# Patient Record
Sex: FEMALE | Race: BLACK OR AFRICAN AMERICAN | Employment: UNEMPLOYED | URBAN - METROPOLITAN AREA
[De-identification: names, ages, dates, MRNs, and addresses within clinical notes are randomized per-mention and may not be internally consistent; named-entity substitution may affect disease eponyms.]

---

## 2019-05-19 ENCOUNTER — HOSPITAL ENCOUNTER (EMERGENCY)
Age: 84
Discharge: HOME | End: 2019-05-19
Attending: EMERGENCY MEDICINE
Payer: MEDICARE

## 2019-05-19 ENCOUNTER — APPOINTMENT (OUTPATIENT)
Dept: GENERAL RADIOLOGY | Age: 84
End: 2019-05-19
Payer: MEDICARE

## 2019-05-19 VITALS
TEMPERATURE: 98.1 F | DIASTOLIC BLOOD PRESSURE: 93 MMHG | RESPIRATION RATE: 18 BRPM | SYSTOLIC BLOOD PRESSURE: 179 MMHG | BODY MASS INDEX: 30.32 KG/M2 | HEIGHT: 65 IN | WEIGHT: 182 LBS | HEART RATE: 74 BPM | OXYGEN SATURATION: 99 %

## 2019-05-19 DIAGNOSIS — I10 ESSENTIAL HYPERTENSION: ICD-10-CM
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
## 2019-05-19 DIAGNOSIS — M25.561 ACUTE PAIN OF RIGHT KNEE: ICD-10-CM

## 2019-05-19 PROCEDURE — 6370000000 HC RX 637 (ALT 250 FOR IP): Performed by: STUDENT IN AN ORGANIZED HEALTH CARE EDUCATION/TRAINING PROGRAM

## 2019-05-19 PROCEDURE — 73562 X-RAY EXAM OF KNEE 3: CPT

## 2019-05-19 PROCEDURE — 99283 EMERGENCY DEPT VISIT LOW MDM: CPT

## 2019-05-19 PROCEDURE — 73552 X-RAY EXAM OF FEMUR 2/>: CPT

## 2019-05-19 RX ORDER — LISINOPRIL 20 MG/1
20 TABLET ORAL DAILY
Status: DISCONTINUED | OUTPATIENT
Start: 2019-05-19 | End: 2019-05-19 | Stop reason: HOSPADM

## 2019-05-19 RX ORDER — AMLODIPINE BESYLATE 5 MG/1
5 TABLET ORAL DAILY
Status: DISCONTINUED | OUTPATIENT
Start: 2019-05-19 | End: 2019-05-19 | Stop reason: HOSPADM

## 2019-05-19 RX ORDER — METOPROLOL SUCCINATE 25 MG/1
25 TABLET, EXTENDED RELEASE ORAL DAILY
Status: DISCONTINUED | OUTPATIENT
Start: 2019-05-19 | End: 2019-05-19 | Stop reason: HOSPADM

## 2019-05-19 RX ORDER — HYDROCODONE BITARTRATE AND ACETAMINOPHEN 5; 325 MG/1; MG/1
1 TABLET ORAL ONCE
Status: COMPLETED | OUTPATIENT
Start: 2019-05-19 | End: 2019-05-19

## 2019-05-19 RX ORDER — HYDRALAZINE HYDROCHLORIDE 20 MG/ML
10 INJECTION INTRAMUSCULAR; INTRAVENOUS ONCE
Status: DISCONTINUED | OUTPATIENT
Start: 2019-05-19 | End: 2019-05-19

## 2019-05-19 RX ADMIN — AMLODIPINE BESYLATE 5 MG: 5 TABLET ORAL at 16:53

## 2019-05-19 RX ADMIN — LISINOPRIL 20 MG: 20 TABLET ORAL at 19:38

## 2019-05-19 RX ADMIN — HYDROCODONE BITARTRATE AND ACETAMINOPHEN 1 TABLET: 5; 325 TABLET ORAL at 16:19

## 2019-05-19 ASSESSMENT — PAIN DESCRIPTION - ORIENTATION
ORIENTATION: LEFT
ORIENTATION: LEFT

## 2019-05-19 ASSESSMENT — PAIN - FUNCTIONAL ASSESSMENT: PAIN_FUNCTIONAL_ASSESSMENT: PREVENTS OR INTERFERES WITH ALL ACTIVE AND SOME PASSIVE ACTIVITIES

## 2019-05-19 ASSESSMENT — PAIN SCALES - GENERAL
PAINLEVEL_OUTOF10: 10

## 2019-05-19 ASSESSMENT — PAIN DESCRIPTION - ONSET: ONSET: SUDDEN

## 2019-05-19 ASSESSMENT — PAIN DESCRIPTION - LOCATION
LOCATION: KNEE;LEG
LOCATION: LEG

## 2019-05-19 ASSESSMENT — PAIN DESCRIPTION - DESCRIPTORS
DESCRIPTORS: ACHING
DESCRIPTORS: ACHING;SHARP

## 2019-05-19 ASSESSMENT — PAIN DESCRIPTION - PROGRESSION: CLINICAL_PROGRESSION: NOT CHANGED

## 2019-05-19 ASSESSMENT — PAIN DESCRIPTION - FREQUENCY: FREQUENCY: CONTINUOUS

## 2019-05-19 ASSESSMENT — PAIN DESCRIPTION - PAIN TYPE
TYPE: ACUTE PAIN
TYPE: ACUTE PAIN

## 2019-05-19 NOTE — ED NOTES
Bed:  [place] [address]  Expected date:   Expected time:   Means of arrival:   Comments:  Jorge A Hurt @ [address] [place] [address], OLIVIA  05/19/19 9511

## 2019-05-19 NOTE — ED PROVIDER NOTES
patients home medications have been reviewed. Allergies: Patient has no known allergies. -------------------------------------------------- RESULTS -------------------------------------------------  Labs:  No results found for this visit on 05/19/19. Radiology:  XR FEMUR LEFT (MIN 2 VIEWS)   Final Result      No fracture identified. XR KNEE LEFT (3 VIEWS)   Final Result   End-stage degenerative joint disease. No evidence of fracture.                      ------------------------- NURSING NOTES AND VITALS REVIEWED ---------------------------  Date / Time Roomed:  5/19/2019  3:11 PM  ED Bed Assignment:  MERA/MERA    The nursing notes within the ED encounter and vital signs as below have been reviewed. BP (!) 179/93   Pulse 74   Temp 98.1 °F (36.7 °C) (Oral)   Resp 18   Ht 5' 5\" (1.651 m)   Wt 182 lb (82.6 kg)   SpO2 99%   BMI 30.29 kg/m²   Oxygen Saturation Interpretation: Normal      ------------------------------------------ PROGRESS NOTES ------------------------------------------  1:58 AM  I have spoken with the patient and discussed todays results, in addition to providing specific details for the plan of care and counseling regarding the diagnosis and prognosis. Their questions are answered at this time and they are agreeable with the plan. I discussed at length with them reasons for immediate return here for re evaluation. They will followup with their primary care physician by calling their office tomorrow. --------------------------------- ADDITIONAL PROVIDER NOTES ---------------------------------  At this time the patient is without objective evidence of an acute process requiring hospitalization or inpatient management. They have remained hemodynamically stable throughout their entire ED visit and are stable for discharge with outpatient follow-up.      The plan has been discussed in detail and they are aware of the specific conditions for emergent return, as well as the importance of follow-up. Discharge Medication List as of 5/19/2019  7:21 PM          Diagnosis:  1. Fall in (into) shower or empty bathtub, initial encounter    2. Acute pain of right knee    3. Essential hypertension        Disposition:  Patient's disposition: Discharge to home  Patient's condition is stable.        Calderon Garber,   Resident  05/23/19 8652

## 2019-05-23 ASSESSMENT — ENCOUNTER SYMPTOMS
TROUBLE SWALLOWING: 0
ABDOMINAL PAIN: 0
CONSTIPATION: 0
DIARRHEA: 0
CHEST TIGHTNESS: 0
SHORTNESS OF BREATH: 0
BLOOD IN STOOL: 0
WHEEZING: 0
SINUS PRESSURE: 0
NAUSEA: 0
COUGH: 0
BACK PAIN: 0
EYE REDNESS: 0
VOMITING: 0
PHOTOPHOBIA: 0
RHINORRHEA: 0
SORE THROAT: 0
ABDOMINAL DISTENTION: 0
EYE PAIN: 0

## 2019-06-21 ENCOUNTER — TELEPHONE (OUTPATIENT)
Dept: NON INVASIVE DIAGNOSTICS | Age: 84
End: 2019-06-21

## 2019-06-25 ENCOUNTER — HOSPITAL ENCOUNTER (OUTPATIENT)
Dept: NUCLEAR MEDICINE | Age: 84
Discharge: HOME | End: 2019-06-27
Payer: MEDICARE

## 2019-06-25 ENCOUNTER — HOSPITAL ENCOUNTER (OUTPATIENT)
Dept: NON INVASIVE DIAGNOSTICS | Age: 84
Discharge: HOME | End: 2019-06-25
Payer: MEDICARE

## 2019-06-25 LAB
LV EF: 61 %
LVEF MODALITY: NORMAL

## 2019-06-25 PROCEDURE — 93017 CV STRESS TEST TRACING ONLY: CPT

## 2019-06-25 PROCEDURE — 78452 HT MUSCLE IMAGE SPECT MULT: CPT

## 2019-06-25 PROCEDURE — A9500 TC99M SESTAMIBI: HCPCS | Performed by: RADIOLOGY

## 2019-06-25 PROCEDURE — 93018 CV STRESS TEST I&R ONLY: CPT | Performed by: INTERNAL MEDICINE

## 2019-06-25 PROCEDURE — 6360000002 HC RX W HCPCS: Performed by: GENERAL PRACTICE

## 2019-06-25 PROCEDURE — 3430000000 HC RX DIAGNOSTIC RADIOPHARMACEUTICAL: Performed by: RADIOLOGY

## 2019-06-25 PROCEDURE — 93016 CV STRESS TEST SUPVJ ONLY: CPT | Performed by: INTERNAL MEDICINE

## 2019-06-25 RX ADMIN — Medication 35 MILLICURIE: at 11:18

## 2019-06-25 RX ADMIN — Medication 10 MILLICURIE: at 09:45

## 2019-06-25 RX ADMIN — REGADENOSON 0.4 MG: 0.08 INJECTION, SOLUTION INTRAVENOUS at 11:10

## 2019-06-25 NOTE — PROCEDURES
Lexiscan Stress EKG Report:    Baseline EKG: normal sinus rhythm, nonspecific ST and T waves changes. Stress EKG: No ST-T changes. Arrhythmias: None. Symptoms: None. Summary:  Unremarkable lexiscan stress EKG. See separate report for stress perfusion results. Maddy Harris D.O.   Cardiologist  Cardiology, [facility]

## 2019-07-30 ENCOUNTER — APPOINTMENT (OUTPATIENT)
Dept: GENERAL RADIOLOGY | Age: 84
DRG: 551 | End: 2019-07-30
Attending: GENERAL PRACTICE
Payer: MEDICARE

## 2019-07-30 ENCOUNTER — HOSPITAL ENCOUNTER (INPATIENT)
Age: 84
LOS: 10 days | Discharge: NURSING, REHAB OR LONG-TERM CARE | DRG: 551 | End: 2019-08-09
Attending: GENERAL PRACTICE | Admitting: GENERAL PRACTICE
Payer: MEDICARE

## 2019-07-30 ENCOUNTER — APPOINTMENT (OUTPATIENT)
Dept: ULTRASOUND IMAGING | Age: 84
DRG: 551 | End: 2019-07-30
Attending: GENERAL PRACTICE
Payer: MEDICARE

## 2019-07-30 PROBLEM — R29.6 FREQUENT FALLS: Status: ACTIVE | Noted: 2019-07-30

## 2019-07-30 LAB
ALBUMIN SERPL-MCNC: 3.9 G/DL (ref 3.5–5.2)
ALP BLD-CCNC: 70 U/L (ref 35–104)
ALT SERPL-CCNC: 6 U/L (ref 0–32)
ANION GAP SERPL CALCULATED.3IONS-SCNC: 11 MMOL/L (ref 7–16)
AST SERPL-CCNC: 14 U/L (ref 0–31)
BILIRUB SERPL-MCNC: 0.4 MG/DL (ref 0–1.2)
BUN BLDV-MCNC: 12 MG/DL (ref 8–23)
CALCIUM SERPL-MCNC: 9.6 MG/DL (ref 8.6–10.2)
CHLORIDE BLD-SCNC: 103 MMOL/L (ref 98–107)
CO2: 31 MMOL/L (ref 22–29)
CREAT SERPL-MCNC: 0.6 MG/DL (ref 0.5–1)
GFR AFRICAN AMERICAN: >60
GFR NON-AFRICAN AMERICAN: >60 ML/MIN/1.73
GLUCOSE BLD-MCNC: 131 MG/DL (ref 74–99)
HCT VFR BLD CALC: 37.9 % (ref 34–48)
HEMOGLOBIN: 11.9 G/DL (ref 11.5–15.5)
MCH RBC QN AUTO: 30.1 PG (ref 26–35)
MCHC RBC AUTO-ENTMCNC: 31.4 % (ref 32–34.5)
MCV RBC AUTO: 95.7 FL (ref 80–99.9)
PDW BLD-RTO: 12.9 FL (ref 11.5–15)
PLATELET # BLD: 216 E9/L (ref 130–450)
PMV BLD AUTO: 12.4 FL (ref 7–12)
POTASSIUM SERPL-SCNC: 3 MMOL/L (ref 3.5–5)
RBC # BLD: 3.96 E12/L (ref 3.5–5.5)
SEDIMENTATION RATE, ERYTHROCYTE: 105 MM/HR (ref 0–20)
SODIUM BLD-SCNC: 145 MMOL/L (ref 132–146)
TOTAL PROTEIN: 7.9 G/DL (ref 6.4–8.3)
TSH SERPL DL<=0.05 MIU/L-ACNC: 2.32 UIU/ML (ref 0.27–4.2)
WBC # BLD: 8.3 E9/L (ref 4.5–11.5)

## 2019-07-30 PROCEDURE — 72100 X-RAY EXAM L-S SPINE 2/3 VWS: CPT

## 2019-07-30 PROCEDURE — 85027 COMPLETE CBC AUTOMATED: CPT

## 2019-07-30 PROCEDURE — 1200000000 HC SEMI PRIVATE

## 2019-07-30 PROCEDURE — 80053 COMPREHEN METABOLIC PANEL: CPT

## 2019-07-30 PROCEDURE — 85651 RBC SED RATE NONAUTOMATED: CPT

## 2019-07-30 PROCEDURE — 36415 COLL VENOUS BLD VENIPUNCTURE: CPT

## 2019-07-30 PROCEDURE — 84443 ASSAY THYROID STIM HORMONE: CPT

## 2019-07-30 PROCEDURE — 93970 EXTREMITY STUDY: CPT

## 2019-07-30 PROCEDURE — 72040 X-RAY EXAM NECK SPINE 2-3 VW: CPT

## 2019-07-30 PROCEDURE — 6370000000 HC RX 637 (ALT 250 FOR IP): Performed by: GENERAL PRACTICE

## 2019-07-30 RX ORDER — DIAZEPAM 5 MG/1
5 TABLET ORAL DAILY
Status: DISCONTINUED | OUTPATIENT
Start: 2019-07-30 | End: 2019-08-09 | Stop reason: HOSPADM

## 2019-07-30 RX ORDER — ATENOLOL 25 MG/1
25 TABLET ORAL DAILY
Status: DISCONTINUED | OUTPATIENT
Start: 2019-07-30 | End: 2019-08-09 | Stop reason: HOSPADM

## 2019-07-30 RX ORDER — ATENOLOL 25 MG/1
25 TABLET ORAL DAILY
COMMUNITY

## 2019-07-30 RX ORDER — LEVOTHYROXINE SODIUM 175 UG/1
175 TABLET ORAL DAILY
Status: DISCONTINUED | OUTPATIENT
Start: 2019-07-31 | End: 2019-08-09 | Stop reason: HOSPADM

## 2019-07-30 RX ORDER — AMLODIPINE BESYLATE 10 MG/1
10 TABLET ORAL DAILY
Status: DISCONTINUED | OUTPATIENT
Start: 2019-07-30 | End: 2019-08-09 | Stop reason: HOSPADM

## 2019-07-30 RX ORDER — SIMVASTATIN 20 MG
20 TABLET ORAL NIGHTLY
Status: DISCONTINUED | OUTPATIENT
Start: 2019-07-30 | End: 2019-08-02

## 2019-07-30 RX ORDER — LISINOPRIL 20 MG/1
20 TABLET ORAL 2 TIMES DAILY
Status: DISCONTINUED | OUTPATIENT
Start: 2019-07-30 | End: 2019-08-09 | Stop reason: HOSPADM

## 2019-07-30 RX ORDER — TRAMADOL HYDROCHLORIDE 50 MG/1
50 TABLET ORAL EVERY 4 HOURS PRN
Status: DISCONTINUED | OUTPATIENT
Start: 2019-07-30 | End: 2019-08-09 | Stop reason: HOSPADM

## 2019-07-30 RX ORDER — SIMVASTATIN 20 MG
20 TABLET ORAL NIGHTLY
Status: ON HOLD | COMMUNITY
End: 2019-08-09 | Stop reason: HOSPADM

## 2019-07-30 RX ADMIN — SIMVASTATIN 20 MG: 20 TABLET, FILM COATED ORAL at 22:14

## 2019-07-30 RX ADMIN — TRAMADOL HYDROCHLORIDE 50 MG: 50 TABLET, FILM COATED ORAL at 22:14

## 2019-07-30 RX ADMIN — LISINOPRIL 20 MG: 20 TABLET ORAL at 22:14

## 2019-07-30 RX ADMIN — DIAZEPAM 5 MG: 5 TABLET ORAL at 22:14

## 2019-07-30 RX ADMIN — ATENOLOL 25 MG: 25 TABLET ORAL at 19:30

## 2019-07-30 RX ADMIN — AMLODIPINE BESYLATE 10 MG: 10 TABLET ORAL at 19:30

## 2019-07-30 ASSESSMENT — PAIN DESCRIPTION - PROGRESSION: CLINICAL_PROGRESSION: GRADUALLY WORSENING

## 2019-07-30 ASSESSMENT — PAIN DESCRIPTION - FREQUENCY: FREQUENCY: INTERMITTENT

## 2019-07-30 ASSESSMENT — PAIN SCALES - GENERAL
PAINLEVEL_OUTOF10: 0
PAINLEVEL_OUTOF10: 0
PAINLEVEL_OUTOF10: 9

## 2019-07-30 ASSESSMENT — PAIN - FUNCTIONAL ASSESSMENT: PAIN_FUNCTIONAL_ASSESSMENT: PREVENTS OR INTERFERES SOME ACTIVE ACTIVITIES AND ADLS

## 2019-07-30 ASSESSMENT — PAIN DESCRIPTION - LOCATION: LOCATION: NECK

## 2019-07-30 ASSESSMENT — PAIN DESCRIPTION - ORIENTATION: ORIENTATION: MID

## 2019-07-30 ASSESSMENT — PAIN DESCRIPTION - PAIN TYPE: TYPE: CHRONIC PAIN

## 2019-07-30 ASSESSMENT — PAIN DESCRIPTION - ONSET: ONSET: GRADUAL

## 2019-07-30 ASSESSMENT — PAIN DESCRIPTION - DESCRIPTORS: DESCRIPTORS: ACHING;CONSTANT;CRAMPING

## 2019-07-31 ENCOUNTER — APPOINTMENT (OUTPATIENT)
Dept: GENERAL RADIOLOGY | Age: 84
DRG: 551 | End: 2019-07-31
Attending: GENERAL PRACTICE
Payer: MEDICARE

## 2019-07-31 ENCOUNTER — APPOINTMENT (OUTPATIENT)
Dept: CT IMAGING | Age: 84
DRG: 551 | End: 2019-07-31
Attending: GENERAL PRACTICE
Payer: MEDICARE

## 2019-07-31 LAB
C-REACTIVE PROTEIN: 11 MG/DL (ref 0–0.4)
EKG ATRIAL RATE: 63 BPM
EKG P AXIS: 62 DEGREES
EKG Q-T INTERVAL: 510 MS
EKG QRS DURATION: 96 MS
EKG QTC CALCULATION (BAZETT): 517 MS
EKG R AXIS: 4 DEGREES
EKG T AXIS: 6 DEGREES
EKG VENTRICULAR RATE: 62 BPM
VITAMIN B-12: 151 PG/ML (ref 211–946)

## 2019-07-31 PROCEDURE — 84165 PROTEIN E-PHORESIS SERUM: CPT

## 2019-07-31 PROCEDURE — 36415 COLL VENOUS BLD VENIPUNCTURE: CPT

## 2019-07-31 PROCEDURE — 1200000000 HC SEMI PRIVATE

## 2019-07-31 PROCEDURE — 86038 ANTINUCLEAR ANTIBODIES: CPT

## 2019-07-31 PROCEDURE — 93005 ELECTROCARDIOGRAM TRACING: CPT | Performed by: GENERAL PRACTICE

## 2019-07-31 PROCEDURE — 99223 1ST HOSP IP/OBS HIGH 75: CPT | Performed by: PSYCHIATRY & NEUROLOGY

## 2019-07-31 PROCEDURE — 6360000002 HC RX W HCPCS: Performed by: GENERAL PRACTICE

## 2019-07-31 PROCEDURE — 70450 CT HEAD/BRAIN W/O DYE: CPT

## 2019-07-31 PROCEDURE — 93010 ELECTROCARDIOGRAM REPORT: CPT | Performed by: INTERNAL MEDICINE

## 2019-07-31 PROCEDURE — 86140 C-REACTIVE PROTEIN: CPT

## 2019-07-31 PROCEDURE — 82607 VITAMIN B-12: CPT

## 2019-07-31 PROCEDURE — 72125 CT NECK SPINE W/O DYE: CPT

## 2019-07-31 PROCEDURE — 73502 X-RAY EXAM HIP UNI 2-3 VIEWS: CPT

## 2019-07-31 PROCEDURE — 6370000000 HC RX 637 (ALT 250 FOR IP): Performed by: GENERAL PRACTICE

## 2019-07-31 PROCEDURE — 71046 X-RAY EXAM CHEST 2 VIEWS: CPT

## 2019-07-31 PROCEDURE — 86200 CCP ANTIBODY: CPT

## 2019-07-31 PROCEDURE — 86592 SYPHILIS TEST NON-TREP QUAL: CPT

## 2019-07-31 RX ORDER — POTASSIUM CHLORIDE 20 MEQ/1
20 TABLET, EXTENDED RELEASE ORAL 2 TIMES DAILY WITH MEALS
Status: DISCONTINUED | OUTPATIENT
Start: 2019-07-31 | End: 2019-08-09 | Stop reason: HOSPADM

## 2019-07-31 RX ORDER — CHLORTHALIDONE 25 MG/1
25 TABLET ORAL DAILY
Status: DISCONTINUED | OUTPATIENT
Start: 2019-07-31 | End: 2019-08-09 | Stop reason: HOSPADM

## 2019-07-31 RX ORDER — POTASSIUM CHLORIDE 20 MEQ/1
40 TABLET, EXTENDED RELEASE ORAL ONCE
Status: DISCONTINUED | OUTPATIENT
Start: 2019-07-31 | End: 2019-08-09 | Stop reason: HOSPADM

## 2019-07-31 RX ADMIN — CHLORTHALIDONE 25 MG: 25 TABLET ORAL at 14:22

## 2019-07-31 RX ADMIN — POTASSIUM CHLORIDE 20 MEQ: 20 TABLET, EXTENDED RELEASE ORAL at 12:58

## 2019-07-31 RX ADMIN — TRAMADOL HYDROCHLORIDE 50 MG: 50 TABLET, FILM COATED ORAL at 20:12

## 2019-07-31 RX ADMIN — ENOXAPARIN SODIUM 40 MG: 40 INJECTION SUBCUTANEOUS at 12:59

## 2019-07-31 RX ADMIN — DIAZEPAM 5 MG: 5 TABLET ORAL at 12:59

## 2019-07-31 RX ADMIN — LEVOTHYROXINE SODIUM 175 MCG: 0.17 TABLET ORAL at 06:27

## 2019-07-31 RX ADMIN — SIMVASTATIN 20 MG: 20 TABLET, FILM COATED ORAL at 20:13

## 2019-07-31 RX ADMIN — LISINOPRIL 20 MG: 20 TABLET ORAL at 14:23

## 2019-07-31 RX ADMIN — POTASSIUM CHLORIDE 20 MEQ: 20 TABLET, EXTENDED RELEASE ORAL at 20:14

## 2019-07-31 RX ADMIN — ATENOLOL 25 MG: 25 TABLET ORAL at 14:22

## 2019-07-31 RX ADMIN — LISINOPRIL 20 MG: 20 TABLET ORAL at 20:13

## 2019-07-31 RX ADMIN — AMLODIPINE BESYLATE 10 MG: 10 TABLET ORAL at 14:23

## 2019-07-31 ASSESSMENT — PAIN SCALES - GENERAL
PAINLEVEL_OUTOF10: 0
PAINLEVEL_OUTOF10: 0
PAINLEVEL_OUTOF10: 7

## 2019-07-31 NOTE — CONSULTS
History of Present Illness:      Stefany Brock is a 81 y.o. female, neurology was consulted for gait disturbance. Patient directly admitted yesterday by Dr. General Gordillo for frequent falls and failure to ambulate. US LEs shows no DVT. XR lumbar spine shows degenerative changes with spondylosis. XR C spine limited study. Labs significant for a potassium of 3.0 and sed rate 105. Patient tells me she lives by herself and has frequent falls. She fell in her shower a few weeks ago, unsure what happened but denies passing out or hitting her head. She cannot tell me how or why she fell, but she has had neck pain and R hip pain since the fall. Past Medical History:     Past Medical History:   Diagnosis Date    Abnormal EKG     Arthritis     Chest pain     History of cardiovascular stress test 08/2012    lexiscan nuclear stress    Hypertension     Iatrogenic hyperthyroidism 8/20/2012    Irregular heartbeat        Past Surgical History:     Past Surgical History:   Procedure Laterality Date    ECHO COMPL W DOP COLOR FLOW  8/21/2012            Allergies:     Patient has no known allergies. Medications:     Prior to Admission medications    Medication Sig Start Date End Date Taking? Authorizing Provider   simvastatin (ZOCOR) 20 MG tablet Take 20 mg by mouth nightly   Yes Historical Provider, MD   atenolol (TENORMIN) 25 MG tablet Take 25 mg by mouth daily   Yes Historical Provider, MD   amLODIPine (NORVASC) 5 MG tablet Take 10 mg by mouth daily    Yes Historical Provider, MD   lisinopril (PRINIVIL;ZESTRIL) 20 MG tablet Take 20 mg by mouth 2 times daily. Yes Historical Provider, MD   levothyroxine (SYNTHROID) 150 MCG tablet Take 1 tablet by mouth daily. Patient taking differently: Take 175 mcg by mouth daily  8/23/12  Yes Rula SenderDO FACOI   diazepam (VALIUM) 5 MG tablet Take 5 mg by mouth daily. Historical Provider, MD   metoprolol (TOPROL-XL) 50 MG XL tablet Take 50 mg by mouth daily.

## 2019-07-31 NOTE — PROGRESS NOTES
Date: 2019       Patient Name: Booker Romero  : 1935      MRN: [id number]    Physical Therapy order received and chart reviewed. PT evaluation on hold per RN. Pt with EKG and further imaging pending.    Will follow up as appropriate    Blake Mccarty PT, DPT  [id number]

## 2019-07-31 NOTE — H&P
are present in  all four quadrants. EXTERNAL GENITALIA:  Intact. EXTREMITIES:  Peripheral pulses intact. Legs without edema. SPINE:  Shows limited range of motion of cervical spine with pain to  palpation over the right side of the cervical paraspinals, pain  radiating down into the dorsal spine and into the right shoulder,  weakness of the right arm. There is pain in the lumbar spine. Bilateral paravertebral muscle spasm. Negative straight leg raising. Deep tendon reflexes 2+/4. INITIAL IMPRESSION:  At this time is intractable cervical and lumbar  pain with gait dysfunction and inability to walk. The patient will be  brought in. X-rays, possibly MRIs, some chemistries will be checked. Neurological consult. Further orders will be written for as the  clinical course dictates.         Cindy Lazar DO    D: 07/31/2019 8:42:00       T: 07/31/2019 8:48:25     [email or web address]  Job#: [id number]     Doc#: [id number]    CC:

## 2019-08-01 ENCOUNTER — APPOINTMENT (OUTPATIENT)
Dept: MRI IMAGING | Age: 84
DRG: 551 | End: 2019-08-01
Attending: GENERAL PRACTICE
Payer: MEDICARE

## 2019-08-01 ENCOUNTER — APPOINTMENT (OUTPATIENT)
Dept: GENERAL RADIOLOGY | Age: 84
DRG: 551 | End: 2019-08-01
Attending: GENERAL PRACTICE
Payer: MEDICARE

## 2019-08-01 LAB
ANION GAP SERPL CALCULATED.3IONS-SCNC: 11 MMOL/L (ref 7–16)
ANTI-NUCLEAR ANTIBODY (ANA): NEGATIVE
BACTERIA: ABNORMAL /HPF
BILIRUBIN URINE: NEGATIVE
BLOOD, URINE: NEGATIVE
BUN BLDV-MCNC: 17 MG/DL (ref 8–23)
CALCIUM SERPL-MCNC: 9.3 MG/DL (ref 8.6–10.2)
CHLORIDE BLD-SCNC: 101 MMOL/L (ref 98–107)
CLARITY: CLEAR
CO2: 30 MMOL/L (ref 22–29)
COLOR: YELLOW
CREAT SERPL-MCNC: 0.9 MG/DL (ref 0.5–1)
EPITHELIAL CELLS, UA: ABNORMAL /HPF
GFR AFRICAN AMERICAN: >60
GFR NON-AFRICAN AMERICAN: >60 ML/MIN/1.73
GLUCOSE BLD-MCNC: 127 MG/DL (ref 74–99)
GLUCOSE URINE: NEGATIVE MG/DL
HCT VFR BLD CALC: 36.8 % (ref 34–48)
HEMOGLOBIN: 11.6 G/DL (ref 11.5–15.5)
KETONES, URINE: NEGATIVE MG/DL
LACTIC ACID: 1.9 MMOL/L (ref 0.5–2.2)
LEUKOCYTE ESTERASE, URINE: NEGATIVE
MCH RBC QN AUTO: 29.8 PG (ref 26–35)
MCHC RBC AUTO-ENTMCNC: 31.5 % (ref 32–34.5)
MCV RBC AUTO: 94.6 FL (ref 80–99.9)
METER GLUCOSE: 133 MG/DL (ref 74–99)
NITRITE, URINE: POSITIVE
PDW BLD-RTO: 13.2 FL (ref 11.5–15)
PH UA: 6.5 (ref 5–9)
PLATELET # BLD: 180 E9/L (ref 130–450)
PMV BLD AUTO: 13 FL (ref 7–12)
POTASSIUM SERPL-SCNC: 3.2 MMOL/L (ref 3.5–5)
PROTEIN UA: 30 MG/DL
RBC # BLD: 3.89 E12/L (ref 3.5–5.5)
RBC UA: ABNORMAL /HPF (ref 0–2)
RPR: NORMAL
SODIUM BLD-SCNC: 142 MMOL/L (ref 132–146)
SPECIFIC GRAVITY UA: 1.02 (ref 1–1.03)
UROBILINOGEN, URINE: 2 E.U./DL
WBC # BLD: 11.9 E9/L (ref 4.5–11.5)
WBC UA: ABNORMAL /HPF (ref 0–5)

## 2019-08-01 PROCEDURE — 72156 MRI NECK SPINE W/O & W/DYE: CPT

## 2019-08-01 PROCEDURE — 72158 MRI LUMBAR SPINE W/O & W/DYE: CPT

## 2019-08-01 PROCEDURE — 36415 COLL VENOUS BLD VENIPUNCTURE: CPT

## 2019-08-01 PROCEDURE — 71045 X-RAY EXAM CHEST 1 VIEW: CPT

## 2019-08-01 PROCEDURE — 99232 SBSQ HOSP IP/OBS MODERATE 35: CPT | Performed by: PHYSICIAN ASSISTANT

## 2019-08-01 PROCEDURE — 80048 BASIC METABOLIC PNL TOTAL CA: CPT

## 2019-08-01 PROCEDURE — 85027 COMPLETE CBC AUTOMATED: CPT

## 2019-08-01 PROCEDURE — 6360000004 HC RX CONTRAST MEDICATION: Performed by: RADIOLOGY

## 2019-08-01 PROCEDURE — 72157 MRI CHEST SPINE W/O & W/DYE: CPT

## 2019-08-01 PROCEDURE — 82962 GLUCOSE BLOOD TEST: CPT

## 2019-08-01 PROCEDURE — 2580000003 HC RX 258: Performed by: GENERAL PRACTICE

## 2019-08-01 PROCEDURE — 6360000002 HC RX W HCPCS: Performed by: INTERNAL MEDICINE

## 2019-08-01 PROCEDURE — 6370000000 HC RX 637 (ALT 250 FOR IP): Performed by: GENERAL PRACTICE

## 2019-08-01 PROCEDURE — 2580000003 HC RX 258: Performed by: INTERNAL MEDICINE

## 2019-08-01 PROCEDURE — 83605 ASSAY OF LACTIC ACID: CPT

## 2019-08-01 PROCEDURE — 73560 X-RAY EXAM OF KNEE 1 OR 2: CPT

## 2019-08-01 PROCEDURE — 87040 BLOOD CULTURE FOR BACTERIA: CPT

## 2019-08-01 PROCEDURE — A9579 GAD-BASE MR CONTRAST NOS,1ML: HCPCS | Performed by: RADIOLOGY

## 2019-08-01 PROCEDURE — 84145 PROCALCITONIN (PCT): CPT

## 2019-08-01 PROCEDURE — 6360000002 HC RX W HCPCS: Performed by: GENERAL PRACTICE

## 2019-08-01 PROCEDURE — 81001 URINALYSIS AUTO W/SCOPE: CPT

## 2019-08-01 PROCEDURE — 1200000000 HC SEMI PRIVATE

## 2019-08-01 PROCEDURE — 70553 MRI BRAIN STEM W/O & W/DYE: CPT

## 2019-08-01 RX ORDER — CYANOCOBALAMIN 1000 UG/ML
1000 INJECTION INTRAMUSCULAR; SUBCUTANEOUS DAILY
Status: DISCONTINUED | OUTPATIENT
Start: 2019-08-01 | End: 2019-08-09 | Stop reason: HOSPADM

## 2019-08-01 RX ADMIN — TRAMADOL HYDROCHLORIDE 50 MG: 50 TABLET, FILM COATED ORAL at 12:06

## 2019-08-01 RX ADMIN — CHLORTHALIDONE 25 MG: 25 TABLET ORAL at 12:05

## 2019-08-01 RX ADMIN — CYANOCOBALAMIN 1000 MCG: 1000 INJECTION, SOLUTION INTRAMUSCULAR; SUBCUTANEOUS at 20:53

## 2019-08-01 RX ADMIN — ATENOLOL 25 MG: 25 TABLET ORAL at 12:06

## 2019-08-01 RX ADMIN — TRAMADOL HYDROCHLORIDE 50 MG: 50 TABLET, FILM COATED ORAL at 00:56

## 2019-08-01 RX ADMIN — LEVOTHYROXINE SODIUM 175 MCG: 0.17 TABLET ORAL at 06:05

## 2019-08-01 RX ADMIN — LISINOPRIL 20 MG: 20 TABLET ORAL at 12:06

## 2019-08-01 RX ADMIN — AMLODIPINE BESYLATE 10 MG: 10 TABLET ORAL at 12:06

## 2019-08-01 RX ADMIN — DIAZEPAM 5 MG: 5 TABLET ORAL at 12:05

## 2019-08-01 RX ADMIN — ACYCLOVIR SODIUM 840 MG: 50 INJECTION, SOLUTION INTRAVENOUS at 23:06

## 2019-08-01 RX ADMIN — SODIUM CHLORIDE 250 ML: 4.5 INJECTION, SOLUTION INTRAVENOUS at 17:56

## 2019-08-01 RX ADMIN — GADOTERIDOL 17 ML: 279.3 INJECTION, SOLUTION INTRAVENOUS at 11:13

## 2019-08-01 ASSESSMENT — PAIN DESCRIPTION - LOCATION: LOCATION: BACK

## 2019-08-01 ASSESSMENT — PAIN SCALES - GENERAL
PAINLEVEL_OUTOF10: 0
PAINLEVEL_OUTOF10: 8

## 2019-08-01 ASSESSMENT — PAIN DESCRIPTION - DESCRIPTORS: DESCRIPTORS: ACHING;DISCOMFORT;SORE

## 2019-08-01 ASSESSMENT — PAIN DESCRIPTION - PAIN TYPE: TYPE: ACUTE PAIN;CHRONIC PAIN

## 2019-08-01 ASSESSMENT — PAIN DESCRIPTION - ONSET: ONSET: ON-GOING

## 2019-08-01 ASSESSMENT — PAIN DESCRIPTION - FREQUENCY: FREQUENCY: CONTINUOUS

## 2019-08-01 ASSESSMENT — PAIN DESCRIPTION - ORIENTATION: ORIENTATION: LOWER;MID

## 2019-08-01 NOTE — CONSULTS
acute orthopedic intervention if no hip fx on CT  · Awaiting imaging  · Discussed with Dr. Joseluis Gaines

## 2019-08-01 NOTE — PROGRESS NOTES
Vrable, DO   5 mg at 08/01/19 1205    amLODIPine (NORVASC) tablet 10 mg  10 mg Oral Daily Dellia Decree, DO   10 mg at 08/01/19 1206    atenolol (TENORMIN) tablet 25 mg  25 mg Oral Daily Dellia Decree, DO   25 mg at 08/01/19 1206    levothyroxine (SYNTHROID) tablet 175 mcg  175 mcg Oral Daily Dellia Decree, DO   175 mcg at 08/01/19 0137    lisinopril (PRINIVIL;ZESTRIL) tablet 20 mg  20 mg Oral BID Dellia Decree, DO   20 mg at 08/01/19 1206    simvastatin (ZOCOR) tablet 20 mg  20 mg Oral Nightly Dellia Decree, DO   20 mg at 07/31/19 2013    traMADol (ULTRAM) tablet 50 mg  50 mg Oral Q4H PRN Dellia Decree, DO   50 mg at 08/01/19 1206     Objective:       BP (!) 177/77   Pulse 61   Temp 97.5 °F (36.4 °C) (Temporal)   Resp 13   Ht 5' 5\" (1.651 m)   Wt 185 lb (83.9 kg)   SpO2 94% Comment: irregular, between 89-95  BMI 30.79 kg/m²     General appearance: Lethargic. Appears stated age, in no distress   Head: normocephalic, without obvious abnormality, atraumatic  Eyes: conjunctivae/corneas clear. Neck: ttp R lateral neck  Lungs: clear to auscultation bilaterally  Heart: regular rate and rhythm, S1, S2 normal, no murmur, click, rub or gallop  Extremities: ttp R lateral hip worse with ROM RLE, no cyanosis or edema-- opens eyes wide and grimaces with movement of the RLE   Pulses: 2+ and symmetric  Skin: color, texture, turgor normal---no rashes or lesions     Mental Status: Lethargic. Opens eyes to stimulation, but immediately drifts back to sleep. Nonverbal for me. Not following commands.       Exam limited due to lethargy     Cranial Nerves:   I: smell    II: visual acuity     II: visual fields    II: pupils LOIS   III,VII: ptosis None   III,IV,VI: extraocular muscles  Keeps eyes closed    V: mastication    V: facial light touch sensation     V,VII: corneal reflex     VII: facial muscle function - upper  Normal   VII: facial muscle function - lower Appears symmetric    VIII: hearing Intact    IX: soft palate

## 2019-08-01 NOTE — CARE COORDINATION
DISCHARGE PLANNING:    Spoke with patient and her daughter in law Elidia regarding transition of care upon discharge. Pt lives in a 2 plex home alone with her son Teresa Solsi living next door and about 7 steps. Pt plan is to discharge home with [facility] pending PT/OT recommendations. She has no PREET and HHC history. Requires no DME at this time. Pt PCP is Dr. Aggie Anderson. She uses [facility] on [address]. Her son Teresa Solis will be providing transportation when discharged. I encouraged them to follow up with me if they have any further questions. I will continue to follow.

## 2019-08-01 NOTE — PROGRESS NOTES
Occupational Therapy          OT consult received and appreciated. Chart reviewed. Will hold evaluation due to ortho recs and MRI pending . Will evaluate at a later time. Thank you. Daryn Manrique.  Abi 73, Kostas 70

## 2019-08-02 ENCOUNTER — APPOINTMENT (OUTPATIENT)
Dept: CT IMAGING | Age: 84
DRG: 551 | End: 2019-08-02
Attending: GENERAL PRACTICE
Payer: MEDICARE

## 2019-08-02 LAB
ALBUMIN SERPL-MCNC: 3.4 G/DL (ref 3.5–4.7)
ALPHA-1-GLOBULIN: 0.4 G/DL (ref 0.2–0.4)
ALPHA-2-GLOBULIN: 1 G/FL (ref 0.5–1)
B.E.: 6.2 MMOL/L (ref -3–3)
BETA GLOBULIN: 1.7 G/DL (ref 0.8–1.3)
COHB: 0.2 % (ref 0–1.5)
CRITICAL: ABNORMAL
D DIMER: 4175 NG/ML DDU
DATE ANALYZED: ABNORMAL
DATE OF COLLECTION: ABNORMAL
ELECTROPHORESIS: ABNORMAL
FILM ARRAY ADENOVIRUS: NORMAL
FILM ARRAY BORDETELLA PERTUSSIS: NORMAL
FILM ARRAY CHLAMYDOPHILIA PNEUMONIAE: NORMAL
FILM ARRAY CORONAVIRUS 229E: NORMAL
FILM ARRAY CORONAVIRUS HKU1: NORMAL
FILM ARRAY CORONAVIRUS NL63: NORMAL
FILM ARRAY CORONAVIRUS OC43: NORMAL
FILM ARRAY INFLUENZA A VIRUS 09H1: NORMAL
FILM ARRAY INFLUENZA A VIRUS H1: NORMAL
FILM ARRAY INFLUENZA A VIRUS H3: NORMAL
FILM ARRAY INFLUENZA A VIRUS: NORMAL
FILM ARRAY INFLUENZA B: NORMAL
FILM ARRAY METAPNEUMOVIRUS: NORMAL
FILM ARRAY MYCOPLASMA PNEUMONIAE: NORMAL
FILM ARRAY PARAINFLUENZA VIRUS 1: NORMAL
FILM ARRAY PARAINFLUENZA VIRUS 2: NORMAL
FILM ARRAY PARAINFLUENZA VIRUS 3: NORMAL
FILM ARRAY PARAINFLUENZA VIRUS 4: NORMAL
FILM ARRAY RESPIRATORY SYNCITIAL VIRUS: NORMAL
FILM ARRAY RHINOVIRUS/ENTEROVIRUS: NORMAL
GAMMA GLOBULIN: 1.2 G/DL (ref 0.7–1.6)
HCO3: 29.4 MMOL/L (ref 22–26)
HHB: 1.6 % (ref 0–5)
LAB: ABNORMAL
Lab: ABNORMAL
METHB: 0.6 % (ref 0–1.5)
MODE: ABNORMAL
O2 SATURATION: 98.4 % (ref 92–98.5)
O2HB: 97.6 % (ref 94–97)
OPERATOR ID: 1768
PATIENT TEMP: 37 C
PCO2: 37.4 MMHG (ref 35–45)
PH BLOOD GAS: 7.51 (ref 7.35–7.45)
PO2: 111.5 MMHG (ref 60–100)
PRO-BNP: 1346 PG/ML (ref 0–450)
PROCALCITONIN: 0.49 NG/ML (ref 0–0.08)
SOURCE, BLOOD GAS: ABNORMAL
THB: 11.6 G/DL (ref 11.5–16.5)
TIME ANALYZED: 1521
TOTAL PROTEIN: 7.7 G/DL (ref 6.4–8.3)

## 2019-08-02 PROCEDURE — 2700000000 HC OXYGEN THERAPY PER DAY

## 2019-08-02 PROCEDURE — 97530 THERAPEUTIC ACTIVITIES: CPT

## 2019-08-02 PROCEDURE — 87205 SMEAR GRAM STAIN: CPT

## 2019-08-02 PROCEDURE — 0S9D3ZZ DRAINAGE OF LEFT KNEE JOINT, PERCUTANEOUS APPROACH: ICD-10-PCS | Performed by: ORTHOPAEDIC SURGERY

## 2019-08-02 PROCEDURE — 6370000000 HC RX 637 (ALT 250 FOR IP): Performed by: INTERNAL MEDICINE

## 2019-08-02 PROCEDURE — 2580000003 HC RX 258

## 2019-08-02 PROCEDURE — 87581 M.PNEUMON DNA AMP PROBE: CPT

## 2019-08-02 PROCEDURE — 87070 CULTURE OTHR SPECIMN AEROBIC: CPT

## 2019-08-02 PROCEDURE — 89060 EXAM SYNOVIAL FLUID CRYSTALS: CPT

## 2019-08-02 PROCEDURE — 82805 BLOOD GASES W/O2 SATURATION: CPT

## 2019-08-02 PROCEDURE — 6370000000 HC RX 637 (ALT 250 FOR IP): Performed by: GENERAL PRACTICE

## 2019-08-02 PROCEDURE — 99232 SBSQ HOSP IP/OBS MODERATE 35: CPT | Performed by: NURSE PRACTITIONER

## 2019-08-02 PROCEDURE — 87798 DETECT AGENT NOS DNA AMP: CPT

## 2019-08-02 PROCEDURE — 6360000002 HC RX W HCPCS: Performed by: INTERNAL MEDICINE

## 2019-08-02 PROCEDURE — 72192 CT PELVIS W/O DYE: CPT

## 2019-08-02 PROCEDURE — 6360000002 HC RX W HCPCS: Performed by: GENERAL PRACTICE

## 2019-08-02 PROCEDURE — 36415 COLL VENOUS BLD VENIPUNCTURE: CPT

## 2019-08-02 PROCEDURE — 36600 WITHDRAWAL OF ARTERIAL BLOOD: CPT

## 2019-08-02 PROCEDURE — 89051 BODY FLUID CELL COUNT: CPT

## 2019-08-02 PROCEDURE — 87486 CHLMYD PNEUM DNA AMP PROBE: CPT

## 2019-08-02 PROCEDURE — 97162 PT EVAL MOD COMPLEX 30 MIN: CPT

## 2019-08-02 PROCEDURE — 97166 OT EVAL MOD COMPLEX 45 MIN: CPT

## 2019-08-02 PROCEDURE — 87633 RESP VIRUS 12-25 TARGETS: CPT

## 2019-08-02 PROCEDURE — 85378 FIBRIN DEGRADE SEMIQUANT: CPT

## 2019-08-02 PROCEDURE — 1200000000 HC SEMI PRIVATE

## 2019-08-02 PROCEDURE — 84165 PROTEIN E-PHORESIS SERUM: CPT

## 2019-08-02 PROCEDURE — 83880 ASSAY OF NATRIURETIC PEPTIDE: CPT

## 2019-08-02 RX ORDER — COLCHICINE 0.6 MG/1
0.6 TABLET ORAL DAILY
Status: DISCONTINUED | OUTPATIENT
Start: 2019-08-02 | End: 2019-08-09 | Stop reason: HOSPADM

## 2019-08-02 RX ORDER — SIMVASTATIN 10 MG
10 TABLET ORAL NIGHTLY
Status: DISCONTINUED | OUTPATIENT
Start: 2019-08-02 | End: 2019-08-09 | Stop reason: HOSPADM

## 2019-08-02 RX ORDER — SODIUM CHLORIDE 0.9 % (FLUSH) 0.9 %
SYRINGE (ML) INJECTION
Status: COMPLETED
Start: 2019-08-02 | End: 2019-08-02

## 2019-08-02 RX ORDER — METHYLPREDNISOLONE SODIUM SUCCINATE 40 MG/ML
20 INJECTION, POWDER, LYOPHILIZED, FOR SOLUTION INTRAMUSCULAR; INTRAVENOUS ONCE
Status: COMPLETED | OUTPATIENT
Start: 2019-08-02 | End: 2019-08-02

## 2019-08-02 RX ORDER — CIPROFLOXACIN 500 MG/1
500 TABLET, FILM COATED ORAL EVERY 12 HOURS SCHEDULED
Status: DISCONTINUED | OUTPATIENT
Start: 2019-08-02 | End: 2019-08-02

## 2019-08-02 RX ORDER — PREDNISONE 20 MG/1
20 TABLET ORAL DAILY
Status: DISCONTINUED | OUTPATIENT
Start: 2019-08-02 | End: 2019-08-05

## 2019-08-02 RX ADMIN — CYANOCOBALAMIN 1000 MCG: 1000 INJECTION, SOLUTION INTRAMUSCULAR; SUBCUTANEOUS at 09:25

## 2019-08-02 RX ADMIN — AMLODIPINE BESYLATE 10 MG: 10 TABLET ORAL at 09:25

## 2019-08-02 RX ADMIN — PREDNISONE 20 MG: 20 TABLET ORAL at 20:13

## 2019-08-02 RX ADMIN — SIMVASTATIN 10 MG: 20 TABLET, FILM COATED ORAL at 20:13

## 2019-08-02 RX ADMIN — POTASSIUM CHLORIDE 20 MEQ: 20 TABLET, EXTENDED RELEASE ORAL at 09:25

## 2019-08-02 RX ADMIN — METHYLPREDNISOLONE SODIUM SUCCINATE 20 MG: 40 INJECTION, POWDER, FOR SOLUTION INTRAMUSCULAR; INTRAVENOUS at 20:15

## 2019-08-02 RX ADMIN — TRAMADOL HYDROCHLORIDE 50 MG: 50 TABLET, FILM COATED ORAL at 09:26

## 2019-08-02 RX ADMIN — LEVOTHYROXINE SODIUM 175 MCG: 0.17 TABLET ORAL at 09:27

## 2019-08-02 RX ADMIN — Medication 10 ML: at 20:16

## 2019-08-02 RX ADMIN — ATENOLOL 25 MG: 25 TABLET ORAL at 09:25

## 2019-08-02 RX ADMIN — CHLORTHALIDONE 25 MG: 25 TABLET ORAL at 09:25

## 2019-08-02 RX ADMIN — LISINOPRIL 20 MG: 20 TABLET ORAL at 09:25

## 2019-08-02 RX ADMIN — COLCHICINE 0.6 MG: 0.6 TABLET, FILM COATED ORAL at 20:13

## 2019-08-02 RX ADMIN — LISINOPRIL 20 MG: 20 TABLET ORAL at 20:14

## 2019-08-02 RX ADMIN — TRAMADOL HYDROCHLORIDE 50 MG: 50 TABLET, FILM COATED ORAL at 20:13

## 2019-08-02 ASSESSMENT — PAIN DESCRIPTION - FREQUENCY: FREQUENCY: CONTINUOUS

## 2019-08-02 ASSESSMENT — PAIN DESCRIPTION - ORIENTATION
ORIENTATION: OTHER (COMMENT)
ORIENTATION: OTHER (COMMENT)

## 2019-08-02 ASSESSMENT — PAIN SCALES - GENERAL
PAINLEVEL_OUTOF10: 0
PAINLEVEL_OUTOF10: 4
PAINLEVEL_OUTOF10: 0
PAINLEVEL_OUTOF10: 0
PAINLEVEL_OUTOF10: 2
PAINLEVEL_OUTOF10: 7
PAINLEVEL_OUTOF10: 4

## 2019-08-02 ASSESSMENT — PAIN SCALES - WONG BAKER: WONGBAKER_NUMERICALRESPONSE: 0

## 2019-08-02 ASSESSMENT — PAIN DESCRIPTION - PAIN TYPE
TYPE: ACUTE PAIN
TYPE: CHRONIC PAIN
TYPE: CHRONIC PAIN

## 2019-08-02 ASSESSMENT — PAIN - FUNCTIONAL ASSESSMENT: PAIN_FUNCTIONAL_ASSESSMENT: PREVENTS OR INTERFERES SOME ACTIVE ACTIVITIES AND ADLS

## 2019-08-02 ASSESSMENT — PAIN DESCRIPTION - DESCRIPTORS
DESCRIPTORS: ACHING;DISCOMFORT;SORE
DESCRIPTORS: PATIENT UNABLE TO DESCRIBE
DESCRIPTORS: PATIENT UNABLE TO DESCRIBE

## 2019-08-02 ASSESSMENT — PAIN DESCRIPTION - LOCATION: LOCATION: BACK;NECK

## 2019-08-02 ASSESSMENT — PAIN DESCRIPTION - ONSET: ONSET: ON-GOING

## 2019-08-02 NOTE — CONSULTS
Inpatient consult to Pulmonology  Consult performed by: PHONG Campuzano - CNP  Consult ordered by: DO Kp Cotter M.D.,[facility]  Kavin Hines D.O., F.MAIRA.[email or web address].AFSHIN., Jeremy Ovalle M.D. Hanh Condon M.D., Vince Ignacio M.D. Patient:  Nik Gonzalez 81 y.o. female MRN: [id number]     Date of Service: 8/2/2019      PULMONARY CONSULTATION    Reason for Consultation: Hypoxia  Referring Physician: Dr. Naila Forbes D.O. Communication with the referring physician will be sent via the electronic medical record. Chief Complaint: Weakness and altered mental status    CODE STATUS: Full    SUBJECTIVE:  HPI:  Nik Gonzalez is a 81 y.o. female who we are asked to see in consultation today for hypoxia. She originally presented to [facility] on 7/30/2019 from her PCP office with with increasing lower extremity weakness and fatigue. She was brought to her PCP office by her family for difficulty with gait and walking which they state has been getting worse over the past several weeks. She usually ambulates with a cane now is requiring a wheelchair. She has a past medical history significant for hypertension, myofascial pain syndrome, spinal stenosis, irregular heartbeats, history of chest pain, iatrogenic hyperthyroidism, osteoarthritis, and dementia. She has no other prior neurological problems such as paresthesias, numbness, or weakness. She is presently confused and lethargic. There is no family present at bedside during exam.  Therefore, the information has been obtained from extensive review of her medical record. X-rays on admission were obtained of the spine, knee, and hip. Orthopedics was consulted for possible hip fracture and left knee pain. Arthrocentesis was performed on the left knee, 20 cc of blood-tinged synovial fluid was aspirated and sent for culture.   They are recommending conservative management for the non-displaced fracture Oral Once    enoxaparin  40 mg Subcutaneous Daily    diazepam  5 mg Oral Daily    amLODIPine  10 mg Oral Daily    atenolol  25 mg Oral Daily    levothyroxine  175 mcg Oral Daily    lisinopril  20 mg Oral BID    simvastatin  20 mg Oral Nightly       Continuous Infusions:  None  No Known Allergies    REVIEW OF SYSTEMS:  Unable to obtain due to hypersomnolence    OBJECTIVE:   BP (!) 198/88   Pulse 62   Temp 97.6 °F (36.4 °C) (Temporal)   Resp 18   Ht 5' 5\" (1.651 m)   Wt 185 lb (83.9 kg)   SpO2 97%   BMI 30.79 kg/m²   SpO2 Readings from Last 1 Encounters:   08/02/19 97%        I/O:    Intake/Output Summary (Last 24 hours) at 8/2/2019 1403  Last data filed at 8/2/2019 0651  Gross per 24 hour   Intake 100 ml   Output 400 ml   Net -300 ml                      Physical Exam:  General: The patient is lying in bed poorly responsive drifts asleep during conversation   HEENT: Pupils are equal round and reactive to light, there are no oral lesions and no post-nasal drip   Neck: supple without adenopathy  Cardiovascular: regular rate and rhythm without murmur or gallop  Respiratory: Diminished to auscultation bilaterally without wheezing or crackles. Air entry is symmetric  Abdomen: soft, non-tender, non-distended, normal bowel sounds  Extremities: warm, trace peripheral edema, no clubbing  Skin: no rash or lesion  Neurologic: Confused and lethargic not able to follow commands appropriately    No PFTs on file      Imaging personally reviewed:      Hypoxia.       FINDINGS: Cardiac has upper borderline size. No acute infiltrates,   consolidation or pleural effusions are seen. There is no perihilar   vascular congestion.           Impression   Upper borderline size heart.       No acute pulmonary process.         Nuclear stress test 6/25/2019  11.1 mCi of Tc-99m MIBI was injected intravenously at rest and cardiac   SPECT images were performed.  In addition 34 mCi of Tc-99m MIBI was   injected intravenously at maximum

## 2019-08-02 NOTE — CARE COORDINATION
DISCHARGE PLANNING:    Spoke with Linn Vance ([address] [place] [address]) [phone]. She will accept patient and follow along until discharged. Pt and Kiley Mckeon (son) were made aware of discharge plan. Envelope placed in soft chart. I will continue to follow.

## 2019-08-02 NOTE — CONSULTS
[facility]     Consult Note                                 [address] [facility] [place], [place], [address]                   Phone [phone]     Fax [phone]        Date:   8/2/2019  Patient name:  Booker Romero  Date of admission:  7/30/2019  3:37 PM  MRN:   [id number]  YOB: 1935    Reason for Consult: Meningitis    CC: No chief complaint on file. Neck pain    HISTORY OF PRESENT ILLNESS:                The patient is a 81 y.o. female who presented 7/31/2018 with chief complaint of fall. According to the son who is at the bedside she has been having multiple falls in the past and this one was severe for her. He also has history of chronic myofascial pain syndrome, progressive weakness and dementia over the last 6 years. she has been evaluated by neurology and according to the interpretation she has progressive weakness because of dementia, B12 deficiency, lumbar stenosis, possible fracture of right femoral neck. She did had right-sided neck pain even on the initial evaluation, MRI of the brain with without contrast as well as of the C-spine was not concerning for any infectious process. Ortho also evaluated the patient yesterday for a questionable lucency in the x-ray of the pelvis/hip along the line of the right issue him indicating right hip fracture for which nonsurgical management was planned. Overnight patient had some mental status changes and ID was called last night and at that time with limited information I started her on acyclovir however this morning she has been able to answer most of the questions with accurate details.   Fever, chills, some sore throat, no chest pain, shortness of breath, no urinary complaints and she has been kept on ciprofloxacin, not impressive for UTI      Past Medical History:   has a past medical history of Abnormal EKG, Arthritis, Chest pain, History of cardiovascular stress test, Hypertension, Iatrogenic hyperthyroidism, and Irregular heartbeat. Past Surgical History:   has a past surgical history that includes ECHO Compl W Dop Color Flow (8/21/2012). Home Medications:    Prior to Admission medications    Medication Sig Start Date End Date Taking? Authorizing Provider   simvastatin (ZOCOR) 20 MG tablet Take 20 mg by mouth nightly   Yes Historical Provider, MD   atenolol (TENORMIN) 25 MG tablet Take 25 mg by mouth daily   Yes Historical Provider, MD   amLODIPine (NORVASC) 5 MG tablet Take 10 mg by mouth daily    Yes Historical Provider, MD   lisinopril (PRINIVIL;ZESTRIL) 20 MG tablet Take 20 mg by mouth 2 times daily. Yes Historical Provider, MD   levothyroxine (SYNTHROID) 150 MCG tablet Take 1 tablet by mouth daily. Patient taking differently: Take 175 mcg by mouth daily  8/23/12  Yes Enmanuel Medina DO, FACOI   diazepam (VALIUM) 5 MG tablet Take 5 mg by mouth daily. Historical Provider, MD   metoprolol (TOPROL-XL) 50 MG XL tablet Take 50 mg by mouth daily. Historical Provider, MD       Allergies:  Patient has no known allergies. Social History:   reports that she has never smoked. She has never used smokeless tobacco. She reports that she does not drink alcohol. Family History: family history is not on file. REVIEW OF SYSTEMS:    12 point ROS has been done and pertinent neg and positive has been included in HPI and rest are non contributory        PHYSICAL EXAM:    BP (!) 198/88   Pulse 62   Temp 97.6 °F (36.4 °C) (Temporal)   Resp 18   Ht 5' 5\" (1.651 m)   Wt 185 lb (83.9 kg)   SpO2 97%   BMI 30.79 kg/m²    VENT SETTINGS:        General appearance: Alert, Awake, Oriented times x2(had some difficulty in telling the exact month) mild- mod distress because of neck pain  Skin: Warm and dry  Eyes: Pink palpebral conjunctivae. PERRL  Ears: External ears normal.  Nose/Sinuses: Nares normal. Septum midline.  Mucosa normal. No bilaterally of the semimembranosus tendon, long head of the   biceps femoris tendon, and the sacrotuberous ligament. 3. Moderate bilateral osteoarthritis of the hips. 4. Osteopenia. XR KNEE LEFT (1-2 VIEWS)   Final Result   No acute fracture. Severe tricompartmental degenerative joint disease. XR CHEST PORTABLE   Final Result   Upper borderline size heart. No acute pulmonary process. MRI LUMBAR SPINE W WO CONTRAST   Final Result      Multilevel degenerative changes as described. Moderately severe spinal   stenosis at the L3-L4 interspace level. Moderate to severe stenosis   spinal canal the L4-L5 interspace level. No evidence of herniated   nucleus pulposus. No evidence of tumoral or other abnormal   enhancement. MRI CERVICAL SPINE W WO CONTRAST   Final Result   Multilevel disc degenerative changes as described. No   evidence of herniated nucleus pulposus. No evidence of critical spinal   stenosis. Moderately severe spinal stenosis is seen at the C5-C6   interspace level. MRI THORACIC SPINE W WO CONTRAST   Final Result   Multilevel degenerative changes as described. No evidence   of compression fracture. No evidence of spinal stenosis. MRI BRAIN W WO CONTRAST   Final Result   Extensive supratentorial and infratentorial involutional   and chronic ischemic demyelinative changes as described showing   significant interval progression since 2012. CT Head WO Contrast   Final Result   1. No CT evidence of acute intracranial abnormality, as questioned. If   there is clinical concern for potential underlying acute   cerebrovascular infarction/accident or other potential abnormalities   of underlying brain parenchyma, MRI of the brain would be recommended   for more detailed evaluation. .   2. Moderate to moderately severe cerebral volume loss/atrophy with   white matter changes suggestive of sequelae small vessel ischemic   disease.    3. Vascular calcifications

## 2019-08-02 NOTE — PROGRESS NOTES
Info:    RUE  Impaired; Unable to test due to cognition PROM shoulder / elbow Limited 1/2 range    Poor +  and poor FMC/dexterity noted during ADL tasks     LUE Impaired; Unable to test due to cognition   PROM shoulder / elbow Limited 1/2 range  Poor + and poor FMC/dexterity noted during ADL tasks     Hearing: wfl  Sensation: unable to test due to cognition   Tone: hypertonic   Edema: B UE / LE edema                              Comments/Treatment: Upon arrival, patient supine in bed and agreeable to OT session with PT collaboration. Therapist facilitated bed mobility (rolling, supine<>sit) and sitting balance at EOB  (mod/max A; addressing posture, weight shifting and balance) - skilled cuing on hand placement, posture, body mechanics and safety. Therapist facilitated self-care retraining: UB/LB self-care tasks and dependent hand over hand grooming task - cuing on sequencing and safety. Therapist facilitated skilled repositioning in bed with TAPS wedges addressing joint and skin integrity. Skilled monitoring of BP, HR, O2 sats and pts response to treatment (155/68, 70, 99%). Pt demonstrating fair understanding of education/techniques, requiring additional training / education. At end of session, patient semi-supine in bed (bed alarm on) with call light and phone within reach, all lines intact. Pt would benefit from continued skilled OT to increase functional independence and quality of life.     Eval Complexity: mod  Profile and History- med (extensive chart review)  Assessment of Occupational Performance and Identification of Deficits- med  Clinical Decision Making- med     (Evaluation time includes thorough review of current medical information, gathering information on past medical history/social history and prior level of function, completion of standardized testing/informal observation of tasks, assessment of data, and development of POC/Goals.)      Assessment of current deficits   Functional

## 2019-08-02 NOTE — PROGRESS NOTES
labs  · Pending CT, knee xray shows severe OA  · Monitor H&H  · No acute orthopedic intervention  · Discuss with Dr. Cari Calloway

## 2019-08-02 NOTE — CONSULTS
tablet 25 mg, 25 mg, Oral, Daily  levothyroxine (SYNTHROID) tablet 175 mcg, 175 mcg, Oral, Daily  lisinopril (PRINIVIL;ZESTRIL) tablet 20 mg, 20 mg, Oral, BID  simvastatin (ZOCOR) tablet 20 mg, 20 mg, Oral, Nightly  traMADol (ULTRAM) tablet 50 mg, 50 mg, Oral, Q4H PRN     Allergies:  Patient has no known allergies.     Social History:   TOBACCO:   reports that she has never smoked. She has never used smokeless tobacco.  ETOH:   reports that she does not drink alcohol. DRUGS:   has no drug history on file. ACTIVITIES OF DAILY LIVING:    OCCUPATION:    Family History:   Family History   No family history on file.        REVIEW OF SYSTEMS:  Unable to attain due to patient somnolence.     PHYSICAL EXAM:    VITALS:  BP (!) 154/68   Pulse 57   Temp 98.6 °F (37 °C) (Temporal)   Resp 16   Ht 5' 5\" (1.651 m)   Wt 185 lb (83.9 kg)   SpO2 98%   BMI 30.79 kg/m²   CONSTITUTIONAL:  Somnolent, arouses to sternal rub, does not answer questions  MUSCULOSKELETAL:  Right lower Extremity:  · -Log roll  · -awakening to palpation about the right hip  · Compartments soft and compressible, calf non-tender  · +DP & PT pulses, Brisk Cap refill, Toes warm and perfused  · Unable to assess sensation  · Unable to assess motor function     Left leg    - moderate knee effusion. TTP. Warm and well perfused. Unable to eval sensatino and motor due to AMS. Pain with ROM.      Secondary Exam:   · Unable to attain, patient somnolent and non-cooperative     DATA:    CBC:         Lab Results   Component Value Date     WBC 8.3 07/30/2019     RBC 3.96 07/30/2019     HGB 11.9 07/30/2019     HCT 37.9 07/30/2019     MCV 95.7 07/30/2019     MCH 30.1 07/30/2019     MCHC 31.4 07/30/2019     RDW 12.9 07/30/2019      07/30/2019     MPV 12.4 07/30/2019      PT/INR:          Lab Results   Component Value Date     PROTIME 11.9  11/14/2012     INR 1.0 11/14/2012      CRP/ESR:         Lab Results   Component Value Date     CRP 11.0 07/31/2019

## 2019-08-02 NOTE — PROGRESS NOTES
mobility, with activity limited by fatigue and pain. Pt yelling out in pain with movement of BLEs. Pt tolerating 5 min seated EOB with posterior lean. Pt required assistance with trunk and BLEs for sit to supine transfer. Patient would benefit from continued skilled PT to maximize functional mobility independence. RN Ruben updated  Pt left in bed, all needs met, and call bell in reach. Pts/ family goals   1. Did not state one    Patient and or family understand(s) diagnosis, prognosis, and plan of care. PLAN  PT care will be provided in accordance with the objectives noted above. Whenever appropriate, clear delegation orders will be provided for nursing staff. Exercises and functional mobility practice will be used as well as appropriate assistive devices or modalities to obtain goals. Patient and family education will also be administered as needed.     Frequency of treatments: 2-5x/week    Time in  141  Time out  [address] [place] [address] Maddie, PT, DPT  [id number]

## 2019-08-02 NOTE — PROGRESS NOTES
able  SCDs  ID following-- ABTs stopped to be monitored w/o them for now   Ortho following  Rheumatology and pulmonary consulted     Neuro will sign off. Ok to discharge once medically cleared.         Josselin Krause PA-C  11:03 AM  8/2/2019

## 2019-08-03 LAB
APPEARANCE FLUID: NORMAL
CELL COUNT FLUID TYPE: NORMAL
COLOR FLUID: NORMAL
GRAM STAIN ORDERABLE: NORMAL
MONOCYTE, FLUID: 11 %
NEUTROPHIL, FLUID: 89 %
NUCLEATED CELLS FLUID: NORMAL /UL
RBC FLUID: NORMAL /UL

## 2019-08-03 PROCEDURE — 1200000000 HC SEMI PRIVATE

## 2019-08-03 PROCEDURE — 6360000002 HC RX W HCPCS: Performed by: GENERAL PRACTICE

## 2019-08-03 PROCEDURE — 2700000000 HC OXYGEN THERAPY PER DAY

## 2019-08-03 PROCEDURE — 94660 CPAP INITIATION&MGMT: CPT

## 2019-08-03 PROCEDURE — 6370000000 HC RX 637 (ALT 250 FOR IP): Performed by: GENERAL PRACTICE

## 2019-08-03 PROCEDURE — 6370000000 HC RX 637 (ALT 250 FOR IP): Performed by: INTERNAL MEDICINE

## 2019-08-03 RX ORDER — SODIUM CHLORIDE 0.9 % (FLUSH) 0.9 %
10 SYRINGE (ML) INJECTION 2 TIMES DAILY
Status: DISCONTINUED | OUTPATIENT
Start: 2019-08-03 | End: 2019-08-09 | Stop reason: HOSPADM

## 2019-08-03 RX ADMIN — ATENOLOL 25 MG: 25 TABLET ORAL at 09:47

## 2019-08-03 RX ADMIN — COLCHICINE 0.6 MG: 0.6 TABLET, FILM COATED ORAL at 09:47

## 2019-08-03 RX ADMIN — DIAZEPAM 5 MG: 5 TABLET ORAL at 09:46

## 2019-08-03 RX ADMIN — CHLORTHALIDONE 25 MG: 25 TABLET ORAL at 09:49

## 2019-08-03 RX ADMIN — LISINOPRIL 20 MG: 20 TABLET ORAL at 09:47

## 2019-08-03 RX ADMIN — AMLODIPINE BESYLATE 10 MG: 10 TABLET ORAL at 09:47

## 2019-08-03 RX ADMIN — ENOXAPARIN SODIUM 40 MG: 40 INJECTION SUBCUTANEOUS at 09:48

## 2019-08-03 RX ADMIN — POTASSIUM CHLORIDE 20 MEQ: 20 TABLET, EXTENDED RELEASE ORAL at 09:49

## 2019-08-03 RX ADMIN — LISINOPRIL 20 MG: 20 TABLET ORAL at 21:26

## 2019-08-03 RX ADMIN — SIMVASTATIN 10 MG: 20 TABLET, FILM COATED ORAL at 21:26

## 2019-08-03 RX ADMIN — LEVOTHYROXINE SODIUM 175 MCG: 0.17 TABLET ORAL at 06:38

## 2019-08-03 RX ADMIN — PREDNISONE 20 MG: 20 TABLET ORAL at 09:47

## 2019-08-03 RX ADMIN — CYANOCOBALAMIN 1000 MCG: 1000 INJECTION, SOLUTION INTRAMUSCULAR; SUBCUTANEOUS at 09:48

## 2019-08-03 ASSESSMENT — PAIN SCALES - GENERAL
PAINLEVEL_OUTOF10: 0
PAINLEVEL_OUTOF10: 0

## 2019-08-03 NOTE — PROGRESS NOTES
Intake 660 ml   Output --   Net 660 ml     CONSTITUTIONAL:  fatigued, somnolent, cooperative and no apparent distress  NECK:  Supple, symmetrical, trachea midline, no adenopathy, thyroid symmetric, not enlarged and no tenderness, skin normal  HEMATOLOGIC/LYMPHATICS:  no cervical lymphadenopathy and no supraclavicular lymphadenopathy  BACK:  Symmetric, no curvature, spinous processes are non-tender on palpation, paraspinous muscles are non-tender on palpation, no costal vertebral tenderness  LUNGS:  No increased work of breathing, good air exchange, clear to auscultation bilaterally, no crackles or wheezing  CARDIOVASCULAR:  Normal apical impulse, regular rate and rhythm, normal S1 and S2, no S3 or S4, and no murmur noted  ABDOMEN:  No scars, normal bowel sounds, soft, non-distended, non-tender, no masses palpated, no hepatosplenomegally  Data    CBC with Differential:    Lab Results   Component Value Date    WBC 11.9 08/01/2019    RBC 3.89 08/01/2019    HGB 11.6 08/01/2019    HCT 36.8 08/01/2019     08/01/2019    MCV 94.6 08/01/2019    MCH 29.8 08/01/2019    MCHC 31.5 08/01/2019    RDW 13.2 08/01/2019    SEGSPCT 73 08/22/2012    LYMPHOPCT 19 08/22/2012    MONOPCT 6 08/22/2012    BASOPCT 0 08/22/2012    MONOSABS 0.59 08/22/2012    LYMPHSABS 1.75 08/22/2012    EOSABS 0.12 08/22/2012    BASOSABS 0.03 08/22/2012     BMP:    Lab Results   Component Value Date     08/01/2019    K 3.2 08/01/2019     08/01/2019    CO2 30 08/01/2019    BUN 17 08/01/2019    LABALBU 3.4 07/31/2019    LABALBU 4.6 03/10/2012    CREATININE 0.9 08/01/2019    CALCIUM 9.3 08/01/2019    GFRAA >60 08/01/2019    LABGLOM >60 08/01/2019    GLUCOSE 127 08/01/2019    GLUCOSE 134 03/10/2012     ABG:    Lab Results   Component Value Date    PH 7.514 08/02/2019    PCO2 37.4 08/02/2019    PO2 111.5 08/02/2019    HCO3 29.4 08/02/2019    BE 6.2 08/02/2019    O2SAT 98.4 08/02/2019       ASSESSMENT AND PLAN          1.  Assessment: Acute

## 2019-08-04 ENCOUNTER — APPOINTMENT (OUTPATIENT)
Dept: CT IMAGING | Age: 84
DRG: 551 | End: 2019-08-04
Attending: GENERAL PRACTICE
Payer: MEDICARE

## 2019-08-04 LAB — METER GLUCOSE: 125 MG/DL (ref 74–99)

## 2019-08-04 PROCEDURE — 05HB33Z INSERTION OF INFUSION DEVICE INTO RIGHT BASILIC VEIN, PERCUTANEOUS APPROACH: ICD-10-PCS | Performed by: INTERNAL MEDICINE

## 2019-08-04 PROCEDURE — 6360000004 HC RX CONTRAST MEDICATION: Performed by: RADIOLOGY

## 2019-08-04 PROCEDURE — 2580000003 HC RX 258: Performed by: INTERNAL MEDICINE

## 2019-08-04 PROCEDURE — 82962 GLUCOSE BLOOD TEST: CPT

## 2019-08-04 PROCEDURE — C1751 CATH, INF, PER/CENT/MIDLINE: HCPCS

## 2019-08-04 PROCEDURE — 1200000000 HC SEMI PRIVATE

## 2019-08-04 PROCEDURE — 76937 US GUIDE VASCULAR ACCESS: CPT

## 2019-08-04 PROCEDURE — 6360000002 HC RX W HCPCS: Performed by: GENERAL PRACTICE

## 2019-08-04 PROCEDURE — 36410 VNPNXR 3YR/> PHY/QHP DX/THER: CPT

## 2019-08-04 PROCEDURE — 94660 CPAP INITIATION&MGMT: CPT

## 2019-08-04 PROCEDURE — 2580000003 HC RX 258: Performed by: GENERAL PRACTICE

## 2019-08-04 PROCEDURE — 6370000000 HC RX 637 (ALT 250 FOR IP): Performed by: GENERAL PRACTICE

## 2019-08-04 PROCEDURE — 71275 CT ANGIOGRAPHY CHEST: CPT

## 2019-08-04 PROCEDURE — 6360000002 HC RX W HCPCS: Performed by: INTERNAL MEDICINE

## 2019-08-04 PROCEDURE — 6370000000 HC RX 637 (ALT 250 FOR IP): Performed by: INTERNAL MEDICINE

## 2019-08-04 RX ORDER — SODIUM CHLORIDE 0.9 % (FLUSH) 0.9 %
10 SYRINGE (ML) INJECTION PRN
Status: DISCONTINUED | OUTPATIENT
Start: 2019-08-04 | End: 2019-08-09 | Stop reason: HOSPADM

## 2019-08-04 RX ADMIN — LISINOPRIL 20 MG: 20 TABLET ORAL at 11:20

## 2019-08-04 RX ADMIN — Medication 10 ML: at 20:33

## 2019-08-04 RX ADMIN — TRAMADOL HYDROCHLORIDE 50 MG: 50 TABLET, FILM COATED ORAL at 16:16

## 2019-08-04 RX ADMIN — LEVOTHYROXINE SODIUM 175 MCG: 0.17 TABLET ORAL at 06:34

## 2019-08-04 RX ADMIN — HEPARIN 100 UNITS: 100 SYRINGE at 20:33

## 2019-08-04 RX ADMIN — Medication 10 ML: at 14:51

## 2019-08-04 RX ADMIN — ATENOLOL 25 MG: 25 TABLET ORAL at 11:20

## 2019-08-04 RX ADMIN — CYANOCOBALAMIN 1000 MCG: 1000 INJECTION, SOLUTION INTRAMUSCULAR; SUBCUTANEOUS at 11:23

## 2019-08-04 RX ADMIN — AMLODIPINE BESYLATE 10 MG: 10 TABLET ORAL at 11:20

## 2019-08-04 RX ADMIN — ENOXAPARIN SODIUM 40 MG: 40 INJECTION SUBCUTANEOUS at 11:23

## 2019-08-04 RX ADMIN — IOPAMIDOL 60 ML: 755 INJECTION, SOLUTION INTRAVENOUS at 14:51

## 2019-08-04 RX ADMIN — SIMVASTATIN 10 MG: 20 TABLET, FILM COATED ORAL at 20:33

## 2019-08-04 RX ADMIN — POTASSIUM CHLORIDE 20 MEQ: 20 TABLET, EXTENDED RELEASE ORAL at 17:51

## 2019-08-04 RX ADMIN — LISINOPRIL 20 MG: 20 TABLET ORAL at 20:33

## 2019-08-04 ASSESSMENT — PAIN DESCRIPTION - FREQUENCY: FREQUENCY: INTERMITTENT

## 2019-08-04 ASSESSMENT — PAIN DESCRIPTION - ONSET: ONSET: GRADUAL

## 2019-08-04 ASSESSMENT — PAIN SCALES - PAIN ASSESSMENT IN ADVANCED DEMENTIA (PAINAD)
FACIALEXPRESSION: 2
BODYLANGUAGE: 1
TOTALSCORE: 7
BREATHING: 1
NEGVOCALIZATION: 1
CONSOLABILITY: 2

## 2019-08-04 ASSESSMENT — PAIN DESCRIPTION - DESCRIPTORS: DESCRIPTORS: PATIENT UNABLE TO DESCRIBE

## 2019-08-04 ASSESSMENT — PAIN DESCRIPTION - ORIENTATION: ORIENTATION: LOWER

## 2019-08-04 ASSESSMENT — PAIN SCALES - GENERAL
PAINLEVEL_OUTOF10: 0
PAINLEVEL_OUTOF10: 7
PAINLEVEL_OUTOF10: 0
PAINLEVEL_OUTOF10: 0

## 2019-08-04 ASSESSMENT — PAIN DESCRIPTION - PROGRESSION: CLINICAL_PROGRESSION: NOT CHANGED

## 2019-08-04 ASSESSMENT — PAIN DESCRIPTION - LOCATION: LOCATION: LEG;HIP

## 2019-08-04 ASSESSMENT — PAIN - FUNCTIONAL ASSESSMENT: PAIN_FUNCTIONAL_ASSESSMENT: PREVENTS OR INTERFERES SOME ACTIVE ACTIVITIES AND ADLS

## 2019-08-04 NOTE — PROGRESS NOTES
Date: 8/4/2019    Time: 9:27 AM    Patient Placed On BIPAP/CPAP/ Non-Invasive Ventilation? Patient not utilizing bipap    If no must comment. Facial area red/color change? No           If YES are Blister/Lesion present? No   If yes must notify nursing staff  BIPAP/CPAP skin barrier?   No    Skin barrier type:      Comments:        Elana Pace

## 2019-08-04 NOTE — PROGRESS NOTES
Patient is increasingly confused and trying to get out of bed. Stating that 'she wants to go upstairs and get her nightgown' when trying to redirect the patient, she knows shes in the hospital but still wants out of bed. Called telesitter office, first in line for a telesitter.

## 2019-08-04 NOTE — PROGRESS NOTES
supratentorial and infratentorial involutional   and chronic ischemic demyelinative changes as described showing   significant interval progression since 2012. CT Head WO Contrast   Final Result   1. No CT evidence of acute intracranial abnormality, as questioned. If   there is clinical concern for potential underlying acute   cerebrovascular infarction/accident or other potential abnormalities   of underlying brain parenchyma, MRI of the brain would be recommended   for more detailed evaluation. .   2. Moderate to moderately severe cerebral volume loss/atrophy with   white matter changes suggestive of sequelae small vessel ischemic   disease. 3. Vascular calcifications within the bilateral internal carotid   artery cavernous segments. .      CT Cervical Spine WO Contrast   Final Result   ALERT:  THIS IS AN ABNORMAL REPORT   1. No CT evidence of an acute fracture, of the cervical spine, as   questioned. If there is persistent clinical pain or symptomatology, a   return to medical attention within 2-7 days and further imaging is   recommended. .   2. Multilevel degenerative changes, extending from C2 through T1, see   above for level by level details. No noemi spinal canal stenosis   identified. Moderately severe degenerative changes C1-C2 articulation. Osteopenia. XR HIP 2-3 VW W PELVIS RIGHT   Final Result   ALERT:  THIS IS AN ABNORMAL REPORT   1. There is a possible nondisplaced fracture of the right femoral neck   versus possible overlapping degenerative change. Further evaluation   with CT scan of the right hip to exclude, evaluate for, possible   fracture is recommended given this patient's history of pain status   post fall. 2. Moderate bilateral osteoarthritis of the hips. 3. Vascular cast case. 4. Osteopenia. XR CHEST STANDARD (2 VW)   Final Result   Stable, large cardiomediastinal silhouette with underlying   mild central pulmonary vascular congestion.       US DUP LOWER

## 2019-08-04 NOTE — PROGRESS NOTES
CHINYERE MIDLINE Placement 8/4/2019    Product number: [id number]   Lot Number: [id number]      Ultrasound: yes   R Basilic      Upper Arm Circumference: 32cm    Size: 4.5f    Exposed Length: 0    Internal Length: 15cm   Cut: 0   Vein Measurement: .44cm    Negin Starks  8/4/2019  10:53 AM

## 2019-08-05 LAB
ALBUMIN SERPL-MCNC: 2.5 G/DL (ref 3.5–4.7)
ALPHA-1-GLOBULIN: 0.4 G/DL (ref 0.2–0.4)
ALPHA-2-GLOBULIN: 1 G/FL (ref 0.5–1)
BETA GLOBULIN: 1.2 G/DL (ref 0.8–1.3)
CRYSTALS, FLUID: NORMAL
ELECTROPHORESIS: ABNORMAL
GAMMA GLOBULIN: 1.1 G/DL (ref 0.7–1.6)
LV EF: 58 %
LVEF MODALITY: NORMAL
TOTAL PROTEIN: 6.3 G/DL (ref 6.4–8.3)

## 2019-08-05 PROCEDURE — 97530 THERAPEUTIC ACTIVITIES: CPT

## 2019-08-05 PROCEDURE — 1200000000 HC SEMI PRIVATE

## 2019-08-05 PROCEDURE — 6360000002 HC RX W HCPCS: Performed by: GENERAL PRACTICE

## 2019-08-05 PROCEDURE — 93306 TTE W/DOPPLER COMPLETE: CPT

## 2019-08-05 PROCEDURE — 2580000003 HC RX 258: Performed by: GENERAL PRACTICE

## 2019-08-05 PROCEDURE — 6360000002 HC RX W HCPCS: Performed by: INTERNAL MEDICINE

## 2019-08-05 PROCEDURE — 94660 CPAP INITIATION&MGMT: CPT

## 2019-08-05 PROCEDURE — 6370000000 HC RX 637 (ALT 250 FOR IP): Performed by: GENERAL PRACTICE

## 2019-08-05 PROCEDURE — 6370000000 HC RX 637 (ALT 250 FOR IP): Performed by: INTERNAL MEDICINE

## 2019-08-05 RX ORDER — PREDNISONE 10 MG/1
10 TABLET ORAL DAILY
Status: DISCONTINUED | OUTPATIENT
Start: 2019-08-06 | End: 2019-08-09 | Stop reason: HOSPADM

## 2019-08-05 RX ADMIN — AMLODIPINE BESYLATE 10 MG: 10 TABLET ORAL at 08:03

## 2019-08-05 RX ADMIN — LEVOTHYROXINE SODIUM 175 MCG: 0.17 TABLET ORAL at 06:31

## 2019-08-05 RX ADMIN — DIAZEPAM 5 MG: 5 TABLET ORAL at 08:03

## 2019-08-05 RX ADMIN — LISINOPRIL 20 MG: 20 TABLET ORAL at 08:03

## 2019-08-05 RX ADMIN — POTASSIUM CHLORIDE 20 MEQ: 20 TABLET, EXTENDED RELEASE ORAL at 16:45

## 2019-08-05 RX ADMIN — ENOXAPARIN SODIUM 40 MG: 40 INJECTION SUBCUTANEOUS at 08:04

## 2019-08-05 RX ADMIN — CYANOCOBALAMIN 1000 MCG: 1000 INJECTION, SOLUTION INTRAMUSCULAR; SUBCUTANEOUS at 08:03

## 2019-08-05 RX ADMIN — Medication 10 ML: at 20:55

## 2019-08-05 RX ADMIN — PREDNISONE 20 MG: 20 TABLET ORAL at 08:03

## 2019-08-05 RX ADMIN — POTASSIUM CHLORIDE 20 MEQ: 20 TABLET, EXTENDED RELEASE ORAL at 08:03

## 2019-08-05 RX ADMIN — HEPARIN 100 UNITS: 100 SYRINGE at 20:55

## 2019-08-05 RX ADMIN — COLCHICINE 0.6 MG: 0.6 TABLET, FILM COATED ORAL at 08:03

## 2019-08-05 RX ADMIN — HEPARIN 100 UNITS: 100 SYRINGE at 08:04

## 2019-08-05 RX ADMIN — SIMVASTATIN 10 MG: 20 TABLET, FILM COATED ORAL at 20:55

## 2019-08-05 RX ADMIN — CHLORTHALIDONE 25 MG: 25 TABLET ORAL at 08:04

## 2019-08-05 RX ADMIN — LISINOPRIL 20 MG: 20 TABLET ORAL at 20:55

## 2019-08-05 RX ADMIN — Medication 10 ML: at 08:04

## 2019-08-05 RX ADMIN — ATENOLOL 25 MG: 25 TABLET ORAL at 08:03

## 2019-08-05 ASSESSMENT — PAIN SCALES - GENERAL
PAINLEVEL_OUTOF10: 0

## 2019-08-05 NOTE — PROGRESS NOTES
believe if had gout or RA  in past.     Per PCP note:  She complains of  significant amount of pain in her neck.  She states that the pain  radiates down into the right arm, finds it very difficult to move her  neck. Nico  brought her in in a wheelchair     -- only painful if ROM R ankle, and some rom hips   painfree but OA chronic changes in R hand/mcp joint, dip, wrists, knees  B/l knee effusions noted b/l not as tender as ankles.              Sp orthopedics tap of knee:   \"inserted into the patellofemoral joint. 20 cc of blood tinged synovial fluid was subsequently aspirated from the knee. 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         Noted xrays from 2011-- do show chondrocalcinosis also in hands  \"  Advanced degenerative changes are present which are greatest at   the level of the distal interphalangeal joints.       Extensive calcification of the triangular fibrocartilage is present. There is calcification adjacent to the third metacarpal phalangeal            ?dementia  b12 related  Low b12-- can contribute to neuropathy --  Replete daily   Teresa neg     Noted hip OA- rule out fx  possible nondisplaced fracture of the right femoral neck   versus possible overlapping degenerative change. Further evaluation   with CT scan of the right hip to exclude, evaluate for, possible   fracture is recommended given this patient's history of pain status   post fall. 2. Moderate bilateral osteoarthritis of the hips.      Fu CT shows of hip NO fracture:  \"  No CT evidence of an acute displaced bony fracture, as questioned.    If there is persistent clinical pain or symptomatology, a

## 2019-08-05 NOTE — PROGRESS NOTES
[facility]     Progress Note    CC: no complaints     SUBJECTIVE:    Patient is seen and examined at bedside   She is awake alert and oriented   Afebrile  Tolerating medications without side effects   No complaints of pain   Overnight, was confused but this has resolved     REVIEW OF SYSTEMS:    12 point ROS has been done and pertinent neg and positive has been included in HPI and rest are non contributory    PHYSICAL EXAM:    BP (!) 185/91   Pulse 57   Temp 97.4 °F (36.3 °C) (Temporal)   Resp 17   Ht 5' 5\" (1.651 m)   Wt 185 lb (83.9 kg)   SpO2 98%   BMI 30.79 kg/m²    VENT SETTINGS:        General appearance: Alert, Awake, Oriented times x2(had some difficulty in telling the exact month)   Skin: Warm and dry  Eyes: Pink palpebral conjunctivae. PERRL  Ears: External ears normal.  Nose/Sinuses: Nares normal. Septum midline. Mucosa normal. No sinus tenderness. Oropharynx: Oropharynx clear with no exudates seen  Neck: Neck supple. No jugular venous distension, lymphadenopathy or thyromegaly Trachea midline  Lungs: Lungs clear to auscultation bilaterally. No rhonchi, crackles or wheezes  Heart: S1 S2  Regular rate and rhythm. No rub, murmur or gallop  Abdomen: Abdomen soft, non-tender. BS normal. No masses, organomegaly  Extremities: No edema, Peripheral pulses palpable  Musculoskeletal: Limited movement of bilateral lower extremity because of pain    DATA:    Labs:     Last 3 CBC:  No results for input(s): WBC, RBC, HGB, PLT, MPV in the last 72 hours. Last 3 BMP  No results for input(s): NA, K, CL, CO2, BUN, CREATININE, GLUCOSE, CALCIUM in the last 72 hours. LIVER PROFILE:  No results for input(s): AST, ALT, LABALBU in the last 72 hours. URINARY CATHETER OUTPUT (Fowler):       DRAIN/TUBE OUTPUT:     Microbiology :  No results for input(s): BC in the last 72 hours.   No results for input(s): Daniel Sosa in the last 72 versus possible overlapping degenerative change. Further evaluation   with CT scan of the right hip to exclude, evaluate for, possible   fracture is recommended given this patient's history of pain status   post fall. 2. Moderate bilateral osteoarthritis of the hips. 3. Vascular cast case. 4. Osteopenia. XR CHEST STANDARD (2 VW)   Final Result   Stable, large cardiomediastinal silhouette with underlying   mild central pulmonary vascular congestion. US DUP LOWER EXTREMITIES BILATERAL VENOUS   Final Result   No evidence to suggest deep venous thrombosis of the bilateral lower   extremities. XR LUMBAR SPINE (2-3 VIEWS)   Final Result   Degenerative changes in the facet joints of the lower   lumbar spine with the spondylosis in mid lower lumbar spine segments. XR CERVICAL SPINE (2-3 VIEWS)   Final Result   Limited evaluation of the cervical spine. Consider   correlation with a CT scan of the cervical spine.           Assessment     · Acute encephalopathy-not concerning for any infectious process at this time  · Multiple falls  · B12 deficiency/lumbar stenosis/gait disturbance  · Chronic myofascial pain/right hip fracture    Plan  - MRI not concerning for infectious process  - following off ATB   -ID signing off    Yuko Griffin MD

## 2019-08-06 PROBLEM — E44.0 MODERATE PROTEIN-CALORIE MALNUTRITION (HCC): Status: ACTIVE | Noted: 2019-08-06

## 2019-08-06 LAB
ALBUMIN SERPL-MCNC: 3.3 G/DL (ref 3.5–5.2)
ALP BLD-CCNC: 74 U/L (ref 35–104)
ALT SERPL-CCNC: 14 U/L (ref 0–32)
ANION GAP SERPL CALCULATED.3IONS-SCNC: 18 MMOL/L (ref 7–16)
AST SERPL-CCNC: 24 U/L (ref 0–31)
BILIRUB SERPL-MCNC: 0.3 MG/DL (ref 0–1.2)
BLOOD CULTURE, ROUTINE: NORMAL
BUN BLDV-MCNC: 42 MG/DL (ref 8–23)
C-REACTIVE PROTEIN: 9.4 MG/DL (ref 0–0.4)
CALCIUM SERPL-MCNC: 9.3 MG/DL (ref 8.6–10.2)
CHLORIDE BLD-SCNC: 98 MMOL/L (ref 98–107)
CO2: 26 MMOL/L (ref 22–29)
CREAT SERPL-MCNC: 0.8 MG/DL (ref 0.5–1)
CULTURE, BLOOD 2: NORMAL
FERRITIN: 453 NG/ML
GFR AFRICAN AMERICAN: >60
GFR NON-AFRICAN AMERICAN: >60 ML/MIN/1.73
GLUCOSE BLD-MCNC: 124 MG/DL (ref 74–99)
HCT VFR BLD CALC: 38.9 % (ref 34–48)
HEMOGLOBIN: 12.9 G/DL (ref 11.5–15.5)
IRON SATURATION: 20 % (ref 15–50)
IRON: 42 MCG/DL (ref 37–145)
MCH RBC QN AUTO: 29.7 PG (ref 26–35)
MCHC RBC AUTO-ENTMCNC: 33.2 % (ref 32–34.5)
MCV RBC AUTO: 89.4 FL (ref 80–99.9)
PDW BLD-RTO: 12.6 FL (ref 11.5–15)
PLATELET # BLD: 347 E9/L (ref 130–450)
PMV BLD AUTO: 12.2 FL (ref 7–12)
POTASSIUM SERPL-SCNC: 4 MMOL/L (ref 3.5–5)
RBC # BLD: 4.35 E12/L (ref 3.5–5.5)
REASON FOR REJECTION: NORMAL
REJECTED TEST: NORMAL
RHEUMATOID FACTOR: <10 IU/ML (ref 0–13)
SEDIMENTATION RATE, ERYTHROCYTE: 113 MM/HR (ref 0–20)
SODIUM BLD-SCNC: 142 MMOL/L (ref 132–146)
TOTAL IRON BINDING CAPACITY: 208 MCG/DL (ref 250–450)
TOTAL PROTEIN: 7.3 G/DL (ref 6.4–8.3)
URIC ACID, SERUM: 2.7 MG/DL (ref 2.4–5.7)
WBC # BLD: 12.6 E9/L (ref 4.5–11.5)

## 2019-08-06 PROCEDURE — 83540 ASSAY OF IRON: CPT

## 2019-08-06 PROCEDURE — 83550 IRON BINDING TEST: CPT

## 2019-08-06 PROCEDURE — 6360000002 HC RX W HCPCS: Performed by: GENERAL PRACTICE

## 2019-08-06 PROCEDURE — 6370000000 HC RX 637 (ALT 250 FOR IP): Performed by: GENERAL PRACTICE

## 2019-08-06 PROCEDURE — 86431 RHEUMATOID FACTOR QUANT: CPT

## 2019-08-06 PROCEDURE — 94660 CPAP INITIATION&MGMT: CPT

## 2019-08-06 PROCEDURE — 85027 COMPLETE CBC AUTOMATED: CPT

## 2019-08-06 PROCEDURE — 36415 COLL VENOUS BLD VENIPUNCTURE: CPT

## 2019-08-06 PROCEDURE — 85651 RBC SED RATE NONAUTOMATED: CPT

## 2019-08-06 PROCEDURE — 2580000003 HC RX 258: Performed by: GENERAL PRACTICE

## 2019-08-06 PROCEDURE — 86140 C-REACTIVE PROTEIN: CPT

## 2019-08-06 PROCEDURE — 84550 ASSAY OF BLOOD/URIC ACID: CPT

## 2019-08-06 PROCEDURE — 6370000000 HC RX 637 (ALT 250 FOR IP): Performed by: INTERNAL MEDICINE

## 2019-08-06 PROCEDURE — 1200000000 HC SEMI PRIVATE

## 2019-08-06 PROCEDURE — 80053 COMPREHEN METABOLIC PANEL: CPT

## 2019-08-06 PROCEDURE — 86200 CCP ANTIBODY: CPT

## 2019-08-06 PROCEDURE — 6360000002 HC RX W HCPCS: Performed by: INTERNAL MEDICINE

## 2019-08-06 PROCEDURE — 82728 ASSAY OF FERRITIN: CPT

## 2019-08-06 RX ADMIN — HEPARIN 100 UNITS: 100 SYRINGE at 21:39

## 2019-08-06 RX ADMIN — ATENOLOL 25 MG: 25 TABLET ORAL at 08:04

## 2019-08-06 RX ADMIN — POTASSIUM CHLORIDE 20 MEQ: 20 TABLET, EXTENDED RELEASE ORAL at 08:04

## 2019-08-06 RX ADMIN — LEVOTHYROXINE SODIUM 175 MCG: 0.17 TABLET ORAL at 06:44

## 2019-08-06 RX ADMIN — CHLORTHALIDONE 25 MG: 25 TABLET ORAL at 08:04

## 2019-08-06 RX ADMIN — COLCHICINE 0.6 MG: 0.6 TABLET, FILM COATED ORAL at 08:04

## 2019-08-06 RX ADMIN — LISINOPRIL 20 MG: 20 TABLET ORAL at 08:04

## 2019-08-06 RX ADMIN — PREDNISONE 10 MG: 10 TABLET ORAL at 08:04

## 2019-08-06 RX ADMIN — Medication 10 ML: at 21:39

## 2019-08-06 RX ADMIN — DIAZEPAM 5 MG: 5 TABLET ORAL at 08:04

## 2019-08-06 RX ADMIN — CYANOCOBALAMIN 1000 MCG: 1000 INJECTION, SOLUTION INTRAMUSCULAR; SUBCUTANEOUS at 08:04

## 2019-08-06 RX ADMIN — LISINOPRIL 20 MG: 20 TABLET ORAL at 21:40

## 2019-08-06 RX ADMIN — HEPARIN 100 UNITS: 100 SYRINGE at 08:05

## 2019-08-06 RX ADMIN — AMLODIPINE BESYLATE 10 MG: 10 TABLET ORAL at 08:04

## 2019-08-06 RX ADMIN — ENOXAPARIN SODIUM 40 MG: 40 INJECTION SUBCUTANEOUS at 08:05

## 2019-08-06 RX ADMIN — SIMVASTATIN 10 MG: 20 TABLET, FILM COATED ORAL at 21:39

## 2019-08-06 RX ADMIN — Medication 10 ML: at 08:05

## 2019-08-06 ASSESSMENT — PAIN SCALES - GENERAL
PAINLEVEL_OUTOF10: 0

## 2019-08-06 NOTE — PLAN OF CARE
Problem: Risk for Impaired Skin Integrity  Goal: Tissue integrity - skin and mucous membranes  Description  Structural intactness and normal physiological function of skin and  mucous membranes.   Outcome: Met This Shift     Problem: Falls - Risk of:  Goal: Will remain free from falls  Description  Will remain free from falls  Outcome: Met This Shift     Problem: Pain:  Goal: Pain level will decrease  Description  Pain level will decrease  Outcome: Met This Shift     Problem: Pain:  Goal: Control of acute pain  Description  Control of acute pain  Outcome: Met This Shift

## 2019-08-07 LAB
BODY FLUID CULTURE, STERILE: NORMAL
GRAM STAIN RESULT: NORMAL

## 2019-08-07 PROCEDURE — 2580000003 HC RX 258: Performed by: GENERAL PRACTICE

## 2019-08-07 PROCEDURE — 6360000002 HC RX W HCPCS: Performed by: INTERNAL MEDICINE

## 2019-08-07 PROCEDURE — 6370000000 HC RX 637 (ALT 250 FOR IP): Performed by: INTERNAL MEDICINE

## 2019-08-07 PROCEDURE — 1200000000 HC SEMI PRIVATE

## 2019-08-07 PROCEDURE — 6360000002 HC RX W HCPCS: Performed by: GENERAL PRACTICE

## 2019-08-07 PROCEDURE — 6370000000 HC RX 637 (ALT 250 FOR IP): Performed by: GENERAL PRACTICE

## 2019-08-07 RX ADMIN — SIMVASTATIN 10 MG: 20 TABLET, FILM COATED ORAL at 20:21

## 2019-08-07 RX ADMIN — Medication 10 ML: at 08:13

## 2019-08-07 RX ADMIN — POTASSIUM CHLORIDE 20 MEQ: 20 TABLET, EXTENDED RELEASE ORAL at 08:18

## 2019-08-07 RX ADMIN — LEVOTHYROXINE SODIUM 175 MCG: 0.17 TABLET ORAL at 06:30

## 2019-08-07 RX ADMIN — PREDNISONE 10 MG: 10 TABLET ORAL at 08:12

## 2019-08-07 RX ADMIN — ATENOLOL 25 MG: 25 TABLET ORAL at 08:10

## 2019-08-07 RX ADMIN — CHLORTHALIDONE 25 MG: 25 TABLET ORAL at 08:11

## 2019-08-07 RX ADMIN — LISINOPRIL 20 MG: 20 TABLET ORAL at 08:11

## 2019-08-07 RX ADMIN — HEPARIN 100 UNITS: 100 SYRINGE at 20:22

## 2019-08-07 RX ADMIN — DIAZEPAM 5 MG: 5 TABLET ORAL at 08:10

## 2019-08-07 RX ADMIN — ENOXAPARIN SODIUM 40 MG: 40 INJECTION SUBCUTANEOUS at 08:12

## 2019-08-07 RX ADMIN — AMLODIPINE BESYLATE 10 MG: 10 TABLET ORAL at 08:11

## 2019-08-07 RX ADMIN — HEPARIN 100 UNITS: 100 SYRINGE at 08:12

## 2019-08-07 RX ADMIN — COLCHICINE 0.6 MG: 0.6 TABLET, FILM COATED ORAL at 08:11

## 2019-08-07 RX ADMIN — POTASSIUM CHLORIDE 20 MEQ: 20 TABLET, EXTENDED RELEASE ORAL at 17:08

## 2019-08-07 RX ADMIN — CYANOCOBALAMIN 1000 MCG: 1000 INJECTION, SOLUTION INTRAMUSCULAR; SUBCUTANEOUS at 08:11

## 2019-08-07 ASSESSMENT — PAIN SCALES - GENERAL
PAINLEVEL_OUTOF10: 0

## 2019-08-08 PROBLEM — M11.89 PSEUDOGOUT INVOLVING MULTIPLE JOINTS: Status: ACTIVE | Noted: 2019-08-08

## 2019-08-08 LAB
CCP IGG ANTIBODIES: 3 UNITS (ref 0–19)
CCP IGG ANTIBODIES: 4 UNITS (ref 0–19)

## 2019-08-08 PROCEDURE — 99221 1ST HOSP IP/OBS SF/LOW 40: CPT | Performed by: PSYCHIATRY & NEUROLOGY

## 2019-08-08 PROCEDURE — 6360000002 HC RX W HCPCS: Performed by: INTERNAL MEDICINE

## 2019-08-08 PROCEDURE — 94660 CPAP INITIATION&MGMT: CPT

## 2019-08-08 PROCEDURE — 6370000000 HC RX 637 (ALT 250 FOR IP): Performed by: INTERNAL MEDICINE

## 2019-08-08 PROCEDURE — 2580000003 HC RX 258: Performed by: GENERAL PRACTICE

## 2019-08-08 PROCEDURE — 1200000000 HC SEMI PRIVATE

## 2019-08-08 PROCEDURE — 6370000000 HC RX 637 (ALT 250 FOR IP): Performed by: GENERAL PRACTICE

## 2019-08-08 PROCEDURE — 6360000002 HC RX W HCPCS: Performed by: GENERAL PRACTICE

## 2019-08-08 RX ADMIN — LISINOPRIL 20 MG: 20 TABLET ORAL at 20:51

## 2019-08-08 RX ADMIN — LEVOTHYROXINE SODIUM 175 MCG: 0.17 TABLET ORAL at 06:27

## 2019-08-08 RX ADMIN — CHLORTHALIDONE 25 MG: 25 TABLET ORAL at 10:04

## 2019-08-08 RX ADMIN — HEPARIN 100 UNITS: 100 SYRINGE at 20:51

## 2019-08-08 RX ADMIN — COLCHICINE 0.6 MG: 0.6 TABLET, FILM COATED ORAL at 10:01

## 2019-08-08 RX ADMIN — DIAZEPAM 5 MG: 5 TABLET ORAL at 10:02

## 2019-08-08 RX ADMIN — LISINOPRIL 20 MG: 20 TABLET ORAL at 10:01

## 2019-08-08 RX ADMIN — PREDNISONE 10 MG: 10 TABLET ORAL at 10:03

## 2019-08-08 RX ADMIN — Medication 10 ML: at 10:03

## 2019-08-08 RX ADMIN — SIMVASTATIN 10 MG: 20 TABLET, FILM COATED ORAL at 20:50

## 2019-08-08 RX ADMIN — POTASSIUM CHLORIDE 20 MEQ: 20 TABLET, EXTENDED RELEASE ORAL at 10:03

## 2019-08-08 RX ADMIN — ENOXAPARIN SODIUM 40 MG: 40 INJECTION SUBCUTANEOUS at 10:02

## 2019-08-08 RX ADMIN — ATENOLOL 25 MG: 25 TABLET ORAL at 10:01

## 2019-08-08 RX ADMIN — POTASSIUM CHLORIDE 20 MEQ: 20 TABLET, EXTENDED RELEASE ORAL at 20:51

## 2019-08-08 RX ADMIN — HEPARIN 100 UNITS: 100 SYRINGE at 10:02

## 2019-08-08 RX ADMIN — AMLODIPINE BESYLATE 10 MG: 10 TABLET ORAL at 10:02

## 2019-08-08 RX ADMIN — CYANOCOBALAMIN 1000 MCG: 1000 INJECTION, SOLUTION INTRAMUSCULAR; SUBCUTANEOUS at 10:04

## 2019-08-08 ASSESSMENT — PAIN SCALES - PAIN ASSESSMENT IN ADVANCED DEMENTIA (PAINAD)
CONSOLABILITY: 2
BODYLANGUAGE: 1
TOTALSCORE: 7
FACIALEXPRESSION: 2
NEGVOCALIZATION: 1
BREATHING: 1

## 2019-08-08 ASSESSMENT — ENCOUNTER SYMPTOMS
RHINORRHEA: 0
ANAL BLEEDING: 0
APNEA: 0
SORE THROAT: 0
VOMITING: 0
EYE REDNESS: 0
EYE ITCHING: 0
SHORTNESS OF BREATH: 0
ABDOMINAL PAIN: 0
BACK PAIN: 1
EYE DISCHARGE: 0
CHEST TIGHTNESS: 0
DIARRHEA: 0
STRIDOR: 0
NAUSEA: 0
EYE PAIN: 0
COUGH: 0
CONSTIPATION: 0
WHEEZING: 0
ABDOMINAL DISTENTION: 0
SINUS PRESSURE: 0
SINUS PAIN: 0
CHOKING: 0

## 2019-08-08 ASSESSMENT — PAIN SCALES - GENERAL
PAINLEVEL_OUTOF10: 0

## 2019-08-08 ASSESSMENT — VISUAL ACUITY: VA_NORMAL: 1

## 2019-08-08 NOTE — CARE COORDINATION
Discharge plan is for the pt to go to [facility]. OK to discharge from consults. Dr Balbir Meza paged through answering service [phone] to see if the pt can be discharged.  Awaiting call back

## 2019-08-08 NOTE — CARE COORDINATION
8/8/2019 social work transition of care  Awaiting nuerology rec pt can discharge to [place] once medically stable.   Electronically signed by MIGUEL Torres on 8/8/2019 at 12:13 PM

## 2019-08-08 NOTE — CONSULTS
kg)   SpO2 98%   BMI 28.17 kg/m²     Physical Exam   Constitutional: She is oriented to person, place, and time. She appears well-developed and well-nourished. No distress. HENT:   Head: Normocephalic and atraumatic. Eyes: Pupils are equal, round, and reactive to light. Conjunctivae and lids are normal.   Neck: Normal range of motion. Neck supple. No JVD present. No tracheal deviation present. No thyromegaly present. Cardiovascular: Normal rate, regular rhythm, S1 normal, S2 normal, normal heart sounds and intact distal pulses. Exam reveals no gallop and no friction rub. No murmur heard. Pulmonary/Chest: Effort normal and breath sounds normal. No stridor. No respiratory distress. She has no wheezes. She has no rales. She exhibits no tenderness. Abdominal: Soft. Bowel sounds are normal. She exhibits no distension and no mass. There is no tenderness. There is no rebound and no guarding. Musculoskeletal: She exhibits edema, tenderness and deformity. Lymphadenopathy:     She has no cervical adenopathy. Neurological: She is alert and oriented to person, place, and time. She has normal reflexes. No cranial nerve deficit. Skin: Skin is warm. She is not diaphoretic. Psychiatric: She has a normal mood and affect. Her speech is normal and behavior is normal. Judgment and thought content normal.               Neurological Exam  Mental Status  Alert. Oriented to person, place and time. Speech is normal. Language is fluent with no aphasia. Cranial Nerves  CN II: Visual acuity is normal. Visual fields full to confrontation. CN III, IV, VI: Extraocular movements intact bilaterally. Normal lids and orbits bilaterally. Pupils equal round and reactive to light bilaterally. CN V: Facial sensation is normal.  CN VII: Full and symmetric facial movement. CN VIII: Hearing is normal.  CN IX, X: Palate elevates symmetrically. Normal gag reflex.   CN XI: Shoulder shrug strength is normal.  CN XII: Tongue midline

## 2019-08-09 VITALS
TEMPERATURE: 97.7 F | SYSTOLIC BLOOD PRESSURE: 157 MMHG | HEART RATE: 53 BPM | WEIGHT: 169.3 LBS | HEIGHT: 65 IN | BODY MASS INDEX: 28.21 KG/M2 | RESPIRATION RATE: 16 BRPM | OXYGEN SATURATION: 99 % | DIASTOLIC BLOOD PRESSURE: 68 MMHG

## 2019-08-09 PROCEDURE — 94660 CPAP INITIATION&MGMT: CPT

## 2019-08-09 PROCEDURE — 6360000002 HC RX W HCPCS: Performed by: GENERAL PRACTICE

## 2019-08-09 PROCEDURE — 6360000002 HC RX W HCPCS: Performed by: INTERNAL MEDICINE

## 2019-08-09 PROCEDURE — 6370000000 HC RX 637 (ALT 250 FOR IP): Performed by: INTERNAL MEDICINE

## 2019-08-09 PROCEDURE — 6370000000 HC RX 637 (ALT 250 FOR IP): Performed by: GENERAL PRACTICE

## 2019-08-09 PROCEDURE — 2580000003 HC RX 258: Performed by: GENERAL PRACTICE

## 2019-08-09 RX ORDER — SIMVASTATIN 10 MG
10 TABLET ORAL NIGHTLY
Qty: 30 TABLET | Refills: 3 | DISCHARGE
Start: 2019-08-09

## 2019-08-09 RX ORDER — LEVOTHYROXINE SODIUM 175 UG/1
175 TABLET ORAL DAILY
Qty: 30 TABLET | Refills: 3 | DISCHARGE
Start: 2019-08-10

## 2019-08-09 RX ORDER — POTASSIUM CHLORIDE 20 MEQ/1
20 TABLET, EXTENDED RELEASE ORAL 2 TIMES DAILY WITH MEALS
Qty: 60 TABLET | Refills: 3 | DISCHARGE
Start: 2019-08-09

## 2019-08-09 RX ORDER — PREDNISONE 10 MG/1
10 TABLET ORAL DAILY
Qty: 10 TABLET | Refills: 0 | DISCHARGE
Start: 2019-08-10 | End: 2019-08-20

## 2019-08-09 RX ORDER — CHLORTHALIDONE 25 MG/1
25 TABLET ORAL DAILY
Qty: 30 TABLET | Refills: 3 | DISCHARGE
Start: 2019-08-10

## 2019-08-09 RX ORDER — COLCHICINE 0.6 MG/1
0.6 TABLET ORAL DAILY
Qty: 30 TABLET | Refills: 3 | DISCHARGE
Start: 2019-08-10

## 2019-08-09 RX ADMIN — LISINOPRIL 20 MG: 20 TABLET ORAL at 08:43

## 2019-08-09 RX ADMIN — CHLORTHALIDONE 25 MG: 25 TABLET ORAL at 08:43

## 2019-08-09 RX ADMIN — LEVOTHYROXINE SODIUM 175 MCG: 0.17 TABLET ORAL at 06:55

## 2019-08-09 RX ADMIN — DIAZEPAM 5 MG: 5 TABLET ORAL at 08:43

## 2019-08-09 RX ADMIN — AMLODIPINE BESYLATE 10 MG: 10 TABLET ORAL at 08:43

## 2019-08-09 RX ADMIN — Medication 10 ML: at 08:44

## 2019-08-09 RX ADMIN — POTASSIUM CHLORIDE 20 MEQ: 20 TABLET, EXTENDED RELEASE ORAL at 08:43

## 2019-08-09 RX ADMIN — TRAMADOL HYDROCHLORIDE 50 MG: 50 TABLET, FILM COATED ORAL at 06:55

## 2019-08-09 RX ADMIN — COLCHICINE 0.6 MG: 0.6 TABLET, FILM COATED ORAL at 08:43

## 2019-08-09 RX ADMIN — ENOXAPARIN SODIUM 40 MG: 40 INJECTION SUBCUTANEOUS at 08:43

## 2019-08-09 RX ADMIN — HEPARIN 100 UNITS: 100 SYRINGE at 08:43

## 2019-08-09 RX ADMIN — CYANOCOBALAMIN 1000 MCG: 1000 INJECTION, SOLUTION INTRAMUSCULAR; SUBCUTANEOUS at 08:43

## 2019-08-09 RX ADMIN — PREDNISONE 10 MG: 10 TABLET ORAL at 08:43

## 2019-08-09 ASSESSMENT — PAIN SCALES - PAIN ASSESSMENT IN ADVANCED DEMENTIA (PAINAD)
CONSOLABILITY: 2
CONSOLABILITY: 2
NEGVOCALIZATION: 1
CONSOLABILITY: 0
BODYLANGUAGE: 1
FACIALEXPRESSION: 2
TOTALSCORE: 0
NEGVOCALIZATION: 1
TOTALSCORE: 7
FACIALEXPRESSION: 0
NEGVOCALIZATION: 0
FACIALEXPRESSION: 2
BREATHING: 1
BREATHING: 0
BODYLANGUAGE: 1
BODYLANGUAGE: 0
TOTALSCORE: 7
BREATHING: 1

## 2019-08-09 ASSESSMENT — PAIN SCALES - GENERAL
PAINLEVEL_OUTOF10: 0
PAINLEVEL_OUTOF10: 7

## 2019-08-09 ASSESSMENT — PAIN DESCRIPTION - PROGRESSION
CLINICAL_PROGRESSION: NOT CHANGED
CLINICAL_PROGRESSION: NOT CHANGED

## 2019-08-09 ASSESSMENT — PAIN SCALES - WONG BAKER
WONGBAKER_NUMERICALRESPONSE: 0
WONGBAKER_NUMERICALRESPONSE: 0

## 2019-08-09 NOTE — PROGRESS NOTES
I was notified that Mrs. Jagdish Azevedo had been prepared for discharge for several days, but that the attending physician was unable to be contacted. I personally made several attempts to reach Dr. Mitchel Swift and was unable to do so. The office was unable to provide me with a covering physician. Although I was told that he had been contacted and would place discharge order, no orders were placed after waiting for most of the day. I have reviewed the chart and interviewed the patient. [address] [place] [address] Attending    S: 81 y.o. female with altered mental status and inability to ambulate. Multiple specialists have been consulted and have cleared her for discharge to [facility] with outpatient f/u. She is awake and anxious to leave the hospital.    O: VS- Blood pressure (!) 167/77, pulse 50, temperature 97.6 °F (36.4 °C), temperature source Temporal, resp. rate 16, height 5' 5\" (1.651 m), weight 169 lb 4.8 oz (76.8 kg), SpO2 97 %. Awake, alert, oriented  \"I haven't seen Dr. Mitchel Swift for three days\"  Heart- RRR  Lungs- clear  Abd - soft    Impressions: Active Problems:    Frequent falls    Multifactorial gait disorder    Moderate protein-calorie malnutrition ([address]  Resolved Problems:    * No resolved hospital problems. *      Plan:   Discharge to [facility] today. Attending Physician Statement  I have reviewed the chart and seen the patient. I personally reviewed images, EKG's and similar tests, if present.   I personally reviewed and performed key elements of the history and exam.       Xiomara Rust MD  Chair, Department of Family Medicine and Pediatrics  [facility]

## 2019-08-09 NOTE — FLOWSHEET NOTE
Inpatient Wound Care(initial evaluation) 5405b    Admit Date: 7/30/2019  3:37 PM    Reason for consult:  Skin assessment at request of family member    Findings:    08/09/19 1400   Skin Integrity Site 2   Skin Integrity Location 2   (dried scabbed healed areas, redness)   Location 2 inner buttocks, jeff-area   Skin Integrity Site 3   Skin Integrity Location 3 Bruising    Location 3 BUE   incontinent of urine and stool    Plan:  purewick until patient soils with BM  Protective barrier  Will need continued preventative care  To ECF when discharged      Ijeoma Hoff 8/9/2019 3:05 PM

## 2019-08-12 ENCOUNTER — HOSPITAL ENCOUNTER (OUTPATIENT)
Age: 84
Discharge: HOME | End: 2019-08-14

## 2019-08-12 PROCEDURE — 86481 TB AG RESPONSE T-CELL SUSP: CPT

## 2019-08-12 PROCEDURE — 36415 COLL VENOUS BLD VENIPUNCTURE: CPT

## 2019-08-19 LAB
COMMENT: NORMAL
REPORT: NORMAL

## 2019-08-20 ENCOUNTER — HOSPITAL ENCOUNTER (OUTPATIENT)
Age: 84
Discharge: HOME | End: 2019-08-22

## 2019-08-20 LAB
ALBUMIN SERPL-MCNC: 3.6 G/DL (ref 3.5–5.2)
ALP BLD-CCNC: 58 U/L (ref 35–104)
ALT SERPL-CCNC: 15 U/L (ref 0–32)
ANION GAP SERPL CALCULATED.3IONS-SCNC: 15 MMOL/L (ref 7–16)
AST SERPL-CCNC: 19 U/L (ref 0–31)
BILIRUB SERPL-MCNC: 0.3 MG/DL (ref 0–1.2)
BUN BLDV-MCNC: 34 MG/DL (ref 8–23)
C-REACTIVE PROTEIN: 1.2 MG/DL (ref 0–0.4)
CALCIUM SERPL-MCNC: 9.5 MG/DL (ref 8.6–10.2)
CHLORIDE BLD-SCNC: 103 MMOL/L (ref 98–107)
CO2: 20 MMOL/L (ref 22–29)
CREAT SERPL-MCNC: 1.3 MG/DL (ref 0.5–1)
GFR AFRICAN AMERICAN: 47
GFR NON-AFRICAN AMERICAN: 47 ML/MIN/1.73
GLUCOSE BLD-MCNC: 96 MG/DL (ref 74–99)
HCT VFR BLD CALC: 35.2 % (ref 34–48)
HEMOGLOBIN: 10.8 G/DL (ref 11.5–15.5)
MCH RBC QN AUTO: 29.5 PG (ref 26–35)
MCHC RBC AUTO-ENTMCNC: 30.7 % (ref 32–34.5)
MCV RBC AUTO: 96.2 FL (ref 80–99.9)
PDW BLD-RTO: 14.3 FL (ref 11.5–15)
PLATELET # BLD: 281 E9/L (ref 130–450)
PMV BLD AUTO: 12.4 FL (ref 7–12)
POTASSIUM SERPL-SCNC: 5.1 MMOL/L (ref 3.5–5)
RBC # BLD: 3.66 E12/L (ref 3.5–5.5)
RHEUMATOID FACTOR: <10 IU/ML (ref 0–13)
SEDIMENTATION RATE, ERYTHROCYTE: 94 MM/HR (ref 0–20)
SODIUM BLD-SCNC: 138 MMOL/L (ref 132–146)
TOTAL PROTEIN: 7.3 G/DL (ref 6.4–8.3)
TSH SERPL DL<=0.05 MIU/L-ACNC: 1.63 UIU/ML (ref 0.27–4.2)
URIC ACID, SERUM: 2.9 MG/DL (ref 2.4–5.7)
WBC # BLD: 7.3 E9/L (ref 4.5–11.5)

## 2019-08-20 PROCEDURE — 36415 COLL VENOUS BLD VENIPUNCTURE: CPT

## 2019-08-20 PROCEDURE — 80053 COMPREHEN METABOLIC PANEL: CPT

## 2019-08-20 PROCEDURE — 86431 RHEUMATOID FACTOR QUANT: CPT

## 2019-08-20 PROCEDURE — 85651 RBC SED RATE NONAUTOMATED: CPT

## 2019-08-20 PROCEDURE — 85027 COMPLETE CBC AUTOMATED: CPT

## 2019-08-20 PROCEDURE — 86140 C-REACTIVE PROTEIN: CPT

## 2019-08-20 PROCEDURE — 84550 ASSAY OF BLOOD/URIC ACID: CPT

## 2019-08-20 PROCEDURE — 86038 ANTINUCLEAR ANTIBODIES: CPT

## 2019-08-20 PROCEDURE — 84443 ASSAY THYROID STIM HORMONE: CPT

## 2019-08-20 PROCEDURE — 86592 SYPHILIS TEST NON-TREP QUAL: CPT

## 2019-08-21 LAB
ANTI-NUCLEAR ANTIBODY (ANA): NEGATIVE
RPR: NORMAL

## 2019-09-19 ENCOUNTER — HOSPITAL ENCOUNTER (OUTPATIENT)
Age: 84
Discharge: HOME | End: 2019-09-21

## 2019-09-19 LAB
ALBUMIN SERPL-MCNC: 3.8 G/DL (ref 3.5–5.2)
ALP BLD-CCNC: 60 U/L (ref 35–104)
ALT SERPL-CCNC: <5 U/L (ref 0–32)
ANION GAP SERPL CALCULATED.3IONS-SCNC: 18 MMOL/L (ref 7–16)
AST SERPL-CCNC: 11 U/L (ref 0–31)
BILIRUB SERPL-MCNC: 0.3 MG/DL (ref 0–1.2)
BUN BLDV-MCNC: 62 MG/DL (ref 8–23)
CALCIUM SERPL-MCNC: 9.8 MG/DL (ref 8.6–10.2)
CHLORIDE BLD-SCNC: 107 MMOL/L (ref 98–107)
CO2: 16 MMOL/L (ref 22–29)
CREAT SERPL-MCNC: 1.8 MG/DL (ref 0.5–1)
FOLATE: 10.6 NG/ML (ref 4.8–24.2)
GFR AFRICAN AMERICAN: 32
GFR NON-AFRICAN AMERICAN: 32 ML/MIN/1.73
GLUCOSE BLD-MCNC: 92 MG/DL (ref 74–99)
HCT VFR BLD CALC: 32.2 % (ref 34–48)
HEMOGLOBIN: 10 G/DL (ref 11.5–15.5)
MCH RBC QN AUTO: 29.3 PG (ref 26–35)
MCHC RBC AUTO-ENTMCNC: 31.1 % (ref 32–34.5)
MCV RBC AUTO: 94.4 FL (ref 80–99.9)
PDW BLD-RTO: 14.2 FL (ref 11.5–15)
PLATELET # BLD: 245 E9/L (ref 130–450)
PMV BLD AUTO: 12.5 FL (ref 7–12)
POTASSIUM SERPL-SCNC: 5.7 MMOL/L (ref 3.5–5)
RBC # BLD: 3.41 E12/L (ref 3.5–5.5)
SODIUM BLD-SCNC: 141 MMOL/L (ref 132–146)
TOTAL PROTEIN: 7.7 G/DL (ref 6.4–8.3)
TSH SERPL DL<=0.05 MIU/L-ACNC: 0.07 UIU/ML (ref 0.27–4.2)
VITAMIN B-12: 677 PG/ML (ref 211–946)
WBC # BLD: 6.1 E9/L (ref 4.5–11.5)

## 2019-09-19 PROCEDURE — 36415 COLL VENOUS BLD VENIPUNCTURE: CPT

## 2019-09-19 PROCEDURE — 80053 COMPREHEN METABOLIC PANEL: CPT

## 2019-09-19 PROCEDURE — 82607 VITAMIN B-12: CPT

## 2019-09-19 PROCEDURE — 84443 ASSAY THYROID STIM HORMONE: CPT

## 2019-09-19 PROCEDURE — 85027 COMPLETE CBC AUTOMATED: CPT

## 2019-09-19 PROCEDURE — 82746 ASSAY OF FOLIC ACID SERUM: CPT

## 2019-09-20 ENCOUNTER — HOSPITAL ENCOUNTER (OUTPATIENT)
Age: 84
Discharge: HOME | End: 2019-09-22

## 2019-09-20 LAB
ANION GAP SERPL CALCULATED.3IONS-SCNC: 10 MMOL/L (ref 7–16)
BUN BLDV-MCNC: 61 MG/DL (ref 8–23)
CALCIUM SERPL-MCNC: 9.9 MG/DL (ref 8.6–10.2)
CHLORIDE BLD-SCNC: 110 MMOL/L (ref 98–107)
CO2: 21 MMOL/L (ref 22–29)
CREAT SERPL-MCNC: 1.4 MG/DL (ref 0.5–1)
GFR AFRICAN AMERICAN: 43
GFR NON-AFRICAN AMERICAN: 43 ML/MIN/1.73
GLUCOSE BLD-MCNC: 106 MG/DL (ref 74–99)
POTASSIUM SERPL-SCNC: 5.4 MMOL/L (ref 3.5–5)
SODIUM BLD-SCNC: 141 MMOL/L (ref 132–146)

## 2019-09-20 PROCEDURE — 36415 COLL VENOUS BLD VENIPUNCTURE: CPT

## 2019-09-20 PROCEDURE — 80048 BASIC METABOLIC PNL TOTAL CA: CPT

## 2019-09-21 ENCOUNTER — HOSPITAL ENCOUNTER (OUTPATIENT)
Age: 84
Discharge: HOME | End: 2019-09-23
Payer: MEDICARE

## 2019-09-21 LAB
ANION GAP SERPL CALCULATED.3IONS-SCNC: 15 MMOL/L (ref 7–16)
BUN BLDV-MCNC: 36 MG/DL (ref 8–23)
CALCIUM SERPL-MCNC: 9.5 MG/DL (ref 8.6–10.2)
CHLORIDE BLD-SCNC: 111 MMOL/L (ref 98–107)
CO2: 15 MMOL/L (ref 22–29)
CREAT SERPL-MCNC: 1 MG/DL (ref 0.5–1)
GFR AFRICAN AMERICAN: >60
GFR NON-AFRICAN AMERICAN: >60 ML/MIN/1.73
GLUCOSE BLD-MCNC: 88 MG/DL (ref 74–99)
POTASSIUM SERPL-SCNC: 5.1 MMOL/L (ref 3.5–5)
SODIUM BLD-SCNC: 141 MMOL/L (ref 132–146)

## 2019-09-21 PROCEDURE — 36415 COLL VENOUS BLD VENIPUNCTURE: CPT

## 2019-09-21 PROCEDURE — 80048 BASIC METABOLIC PNL TOTAL CA: CPT

## 2019-09-27 ENCOUNTER — HOSPITAL ENCOUNTER (OUTPATIENT)
Age: 84
Discharge: HOME | End: 2019-09-29

## 2019-09-27 LAB
ANION GAP SERPL CALCULATED.3IONS-SCNC: 14 MMOL/L (ref 7–16)
BUN BLDV-MCNC: 35 MG/DL (ref 8–23)
CALCIUM SERPL-MCNC: 10.4 MG/DL (ref 8.6–10.2)
CHLORIDE BLD-SCNC: 112 MMOL/L (ref 98–107)
CO2: 17 MMOL/L (ref 22–29)
CREAT SERPL-MCNC: 1.2 MG/DL (ref 0.5–1)
GFR AFRICAN AMERICAN: 52
GFR NON-AFRICAN AMERICAN: 52 ML/MIN/1.73
GLUCOSE BLD-MCNC: 102 MG/DL (ref 74–99)
POTASSIUM SERPL-SCNC: 6.3 MMOL/L (ref 3.5–5)
SODIUM BLD-SCNC: 143 MMOL/L (ref 132–146)

## 2019-09-27 PROCEDURE — 36415 COLL VENOUS BLD VENIPUNCTURE: CPT

## 2019-09-27 PROCEDURE — 80048 BASIC METABOLIC PNL TOTAL CA: CPT

## 2019-09-27 NOTE — DISCHARGE SUMMARY
_____ 25 mg daily. 3.  Colchicine 0.6 b.i.d.  4.  Potassium supplementation twice daily. 5.  Synthroid 175 mcg daily. 6.  Tenormin 25 mg daily. 7.  Amlodipine 5 daily. 8.  Zestril 20 mg daily. 9.  Valium 5 mg daily. I will see her after she is released from the facility, but again stay  in close contact with the family to ensure stability of the patient. At  the time of discharge, her condition was fair. Her prognosis guarded.         Tila Arizmendi DO    D: 09/26/2019 9:30:02       T: 09/26/2019 9:38:01     [id number]  Job#: [id number]     Doc#: [id number]    CC:

## 2019-09-28 ENCOUNTER — HOSPITAL ENCOUNTER (OUTPATIENT)
Age: 84
Discharge: HOME | End: 2019-09-30

## 2019-09-28 LAB
ANION GAP SERPL CALCULATED.3IONS-SCNC: 14 MMOL/L (ref 7–16)
BUN BLDV-MCNC: 39 MG/DL (ref 8–23)
CALCIUM SERPL-MCNC: 10.2 MG/DL (ref 8.6–10.2)
CHLORIDE BLD-SCNC: 109 MMOL/L (ref 98–107)
CO2: 17 MMOL/L (ref 22–29)
CREAT SERPL-MCNC: 1.2 MG/DL (ref 0.5–1)
GFR AFRICAN AMERICAN: 52
GFR NON-AFRICAN AMERICAN: 52 ML/MIN/1.73
GLUCOSE BLD-MCNC: 92 MG/DL (ref 74–99)
HCT VFR BLD CALC: 33.3 % (ref 34–48)
HEMOGLOBIN: 10 G/DL (ref 11.5–15.5)
MCH RBC QN AUTO: 28.4 PG (ref 26–35)
MCHC RBC AUTO-ENTMCNC: 30 % (ref 32–34.5)
MCV RBC AUTO: 94.6 FL (ref 80–99.9)
PDW BLD-RTO: 14.6 FL (ref 11.5–15)
PLATELET # BLD: 220 E9/L (ref 130–450)
PMV BLD AUTO: 12 FL (ref 7–12)
POTASSIUM SERPL-SCNC: 6 MMOL/L (ref 3.5–5)
RBC # BLD: 3.52 E12/L (ref 3.5–5.5)
SODIUM BLD-SCNC: 140 MMOL/L (ref 132–146)
WBC # BLD: 6.6 E9/L (ref 4.5–11.5)

## 2019-09-28 PROCEDURE — 36415 COLL VENOUS BLD VENIPUNCTURE: CPT

## 2019-09-28 PROCEDURE — 85027 COMPLETE CBC AUTOMATED: CPT

## 2019-09-28 PROCEDURE — 80048 BASIC METABOLIC PNL TOTAL CA: CPT

## 2019-09-30 ENCOUNTER — HOSPITAL ENCOUNTER (OUTPATIENT)
Age: 84
Discharge: HOME | End: 2019-10-02

## 2019-09-30 LAB
ANION GAP SERPL CALCULATED.3IONS-SCNC: 16 MMOL/L (ref 7–16)
BUN BLDV-MCNC: 49 MG/DL (ref 8–23)
CALCIUM SERPL-MCNC: 10.2 MG/DL (ref 8.6–10.2)
CHLORIDE BLD-SCNC: 109 MMOL/L (ref 98–107)
CO2: 17 MMOL/L (ref 22–29)
CREAT SERPL-MCNC: 1.5 MG/DL (ref 0.5–1)
GFR AFRICAN AMERICAN: 40
GFR NON-AFRICAN AMERICAN: 40 ML/MIN/1.73
GLUCOSE BLD-MCNC: 96 MG/DL (ref 74–99)
HCT VFR BLD CALC: 32.2 % (ref 34–48)
HEMOGLOBIN: 9.9 G/DL (ref 11.5–15.5)
MCH RBC QN AUTO: 28.9 PG (ref 26–35)
MCHC RBC AUTO-ENTMCNC: 30.7 % (ref 32–34.5)
MCV RBC AUTO: 94.2 FL (ref 80–99.9)
PDW BLD-RTO: 14.5 FL (ref 11.5–15)
PLATELET # BLD: 200 E9/L (ref 130–450)
PMV BLD AUTO: 12.5 FL (ref 7–12)
POTASSIUM SERPL-SCNC: 5.4 MMOL/L (ref 3.5–5)
RBC # BLD: 3.42 E12/L (ref 3.5–5.5)
SODIUM BLD-SCNC: 142 MMOL/L (ref 132–146)
WBC # BLD: 6.8 E9/L (ref 4.5–11.5)

## 2019-09-30 PROCEDURE — 80048 BASIC METABOLIC PNL TOTAL CA: CPT

## 2019-09-30 PROCEDURE — 85027 COMPLETE CBC AUTOMATED: CPT

## 2019-09-30 PROCEDURE — 36415 COLL VENOUS BLD VENIPUNCTURE: CPT

## 2019-10-02 LAB — TSH SERPL DL<=0.05 MIU/L-ACNC: 0.06 UIU/ML (ref 0.27–4.2)

## 2019-10-07 ENCOUNTER — HOSPITAL ENCOUNTER (OUTPATIENT)
Age: 84
Discharge: HOME | End: 2019-10-09

## 2019-10-07 LAB
ALBUMIN SERPL-MCNC: 3.4 G/DL (ref 3.5–5.2)
ALP BLD-CCNC: 70 U/L (ref 35–104)
ALT SERPL-CCNC: <5 U/L (ref 0–32)
ANION GAP SERPL CALCULATED.3IONS-SCNC: 18 MMOL/L (ref 7–16)
AST SERPL-CCNC: 16 U/L (ref 0–31)
BILIRUB SERPL-MCNC: <0.2 MG/DL (ref 0–1.2)
BUN BLDV-MCNC: 81 MG/DL (ref 8–23)
CALCIUM SERPL-MCNC: 9.7 MG/DL (ref 8.6–10.2)
CHLORIDE BLD-SCNC: 107 MMOL/L (ref 98–107)
CO2: 15 MMOL/L (ref 22–29)
CREAT SERPL-MCNC: 2.7 MG/DL (ref 0.5–1)
GFR AFRICAN AMERICAN: 20
GFR NON-AFRICAN AMERICAN: 20 ML/MIN/1.73
GLUCOSE BLD-MCNC: 96 MG/DL (ref 74–99)
HCT VFR BLD CALC: 31.1 % (ref 34–48)
HEMOGLOBIN: 9.6 G/DL (ref 11.5–15.5)
MCH RBC QN AUTO: 29 PG (ref 26–35)
MCHC RBC AUTO-ENTMCNC: 30.9 % (ref 32–34.5)
MCV RBC AUTO: 94 FL (ref 80–99.9)
PDW BLD-RTO: 14.7 FL (ref 11.5–15)
PLATELET # BLD: 227 E9/L (ref 130–450)
PMV BLD AUTO: 12.2 FL (ref 7–12)
POTASSIUM SERPL-SCNC: 4.6 MMOL/L (ref 3.5–5)
RBC # BLD: 3.31 E12/L (ref 3.5–5.5)
SODIUM BLD-SCNC: 140 MMOL/L (ref 132–146)
TOTAL PROTEIN: 7.2 G/DL (ref 6.4–8.3)
TSH SERPL DL<=0.05 MIU/L-ACNC: 0.15 UIU/ML (ref 0.27–4.2)
WBC # BLD: 5.8 E9/L (ref 4.5–11.5)

## 2019-10-07 PROCEDURE — 36415 COLL VENOUS BLD VENIPUNCTURE: CPT

## 2019-10-07 PROCEDURE — 84443 ASSAY THYROID STIM HORMONE: CPT

## 2019-10-07 PROCEDURE — 80053 COMPREHEN METABOLIC PANEL: CPT

## 2019-10-07 PROCEDURE — 85027 COMPLETE CBC AUTOMATED: CPT
